# Patient Record
Sex: FEMALE | Race: WHITE | HISPANIC OR LATINO | Employment: FULL TIME | URBAN - METROPOLITAN AREA
[De-identification: names, ages, dates, MRNs, and addresses within clinical notes are randomized per-mention and may not be internally consistent; named-entity substitution may affect disease eponyms.]

---

## 2018-05-30 ENCOUNTER — HOSPITAL ENCOUNTER (EMERGENCY)
Facility: HOSPITAL | Age: 43
Discharge: HOME | End: 2018-05-30
Attending: EMERGENCY MEDICINE
Payer: COMMERCIAL

## 2018-05-30 VITALS
BODY MASS INDEX: 32.81 KG/M2 | SYSTOLIC BLOOD PRESSURE: 117 MMHG | TEMPERATURE: 97.6 F | WEIGHT: 168 LBS | HEART RATE: 72 BPM | RESPIRATION RATE: 16 BRPM | OXYGEN SATURATION: 100 % | DIASTOLIC BLOOD PRESSURE: 78 MMHG

## 2018-05-30 DIAGNOSIS — H66.91 RIGHT OTITIS MEDIA: Primary | ICD-10-CM

## 2018-05-30 PROCEDURE — 99283 EMERGENCY DEPT VISIT LOW MDM: CPT

## 2018-05-30 RX ORDER — NEOMYCIN SULFATE, POLYMYXIN B SULFATE AND HYDROCORTISONE 10; 3.5; 1 MG/ML; MG/ML; [USP'U]/ML
4 SUSPENSION/ DROPS AURICULAR (OTIC) 3 TIMES DAILY
Qty: 10 ML | Refills: 0 | Status: SHIPPED | OUTPATIENT
Start: 2018-05-30 | End: 2018-08-11 | Stop reason: ALTCHOICE

## 2018-05-30 RX ORDER — NAPROXEN 500 MG/1
500 TABLET ORAL 2 TIMES DAILY WITH MEALS
Qty: 20 TABLET | Refills: 0 | Status: SHIPPED | OUTPATIENT
Start: 2018-05-30 | End: 2018-08-11 | Stop reason: ALTCHOICE

## 2018-05-30 NOTE — ED PROVIDER NOTES
History  Chief Complaint   Patient presents with    Headache     Patient reports today she started experiencing a HA and dizziness  Patient reporting she recently has also been having R ear drainage and pain  c/o R sided ear pain and ear drainage since yest   She feels like this is causing her to have a R sided headaches  No fevers, no n/v, no congestion  Pt  Hasn't been swimming lately  She was trying to clear the drainage/wax out with a q-tip yest             None       Past Medical History:   Diagnosis Date    Vertigo        Past Surgical History:   Procedure Laterality Date     SECTION         History reviewed  No pertinent family history  I have reviewed and agree with the history as documented  Social History   Substance Use Topics    Smoking status: Current Every Day Smoker     Packs/day: 0 50     Types: Cigarettes    Smokeless tobacco: Never Used    Alcohol use No        Review of Systems   Constitutional: Negative for appetite change, fatigue and fever  HENT: Positive for ear discharge and ear pain  Negative for rhinorrhea and sore throat  Respiratory: Negative for cough, shortness of breath and wheezing  Cardiovascular: Negative for chest pain and leg swelling  Gastrointestinal: Negative for abdominal pain, diarrhea and vomiting  Genitourinary: Negative for dysuria and flank pain  Musculoskeletal: Negative for back pain and neck pain  Skin: Negative for rash  Neurological: Negative for syncope and headaches  Psychiatric/Behavioral:        Mood normal       Physical Exam  Physical Exam   Constitutional: She is oriented to person, place, and time  She appears well-developed and well-nourished  HENT:   Head: Normocephalic and atraumatic  Mouth/Throat: Oropharynx is clear and moist    b/l TM's clear, R ear canal is narrowed and tender, mild brownish discharge is present, L ear canal is normal   Neck: Normal range of motion  Neck supple     Cardiovascular: Normal rate and regular rhythm  Pulmonary/Chest: Effort normal and breath sounds normal    Abdominal: Soft  There is no tenderness  Musculoskeletal: Normal range of motion  Neurological: She is alert and oriented to person, place, and time  Skin: Skin is warm and dry  Nursing note and vitals reviewed  Vital Signs  ED Triage Vitals [05/30/18 1412]   Temperature Pulse Respirations Blood Pressure SpO2   97 6 °F (36 4 °C) 72 16 117/78 100 %      Temp Source Heart Rate Source Patient Position - Orthostatic VS BP Location FiO2 (%)   Temporal Monitor Sitting Right arm --      Pain Score       --           Vitals:    05/30/18 1412   BP: 117/78   Pulse: 72   Patient Position - Orthostatic VS: Sitting       Visual Acuity  Visual Acuity      Most Recent Value   L Pupil Size (mm)  3   R Pupil Size (mm)  3          ED Medications  Medications - No data to display    Diagnostic Studies  Results Reviewed     None                 No orders to display              Procedures  Procedures       Phone Contacts  ED Phone Contact    ED Course                               MDM  Number of Diagnoses or Management Options  Risk of Complications, Morbidity, and/or Mortality  Presenting problems: low      CritCare Time    Disposition  Final diagnoses:   Right otitis media     Time reflects when diagnosis was documented in both MDM as applicable and the Disposition within this note     Time User Action Codes Description Comment    5/30/2018  2:46 PM Pan Barragan Add [H66 91] Right otitis media       ED Disposition     ED Disposition Condition Comment    Discharge  Becki Gums discharge to home/self care      Condition at discharge: Stable        Follow-up Information     Follow up With Specialties Details Why Contact Info    Cleo Palm DO Family Medicine   [address] [place] [address]  [phone]            Discharge Medication List as of 5/30/2018  2:47 PM      START taking these medications Details   naproxen (NAPROSYN) 500 mg tablet Take 1 tablet (500 mg total) by mouth 2 (two) times a day with meals, Starting Wed 5/30/2018, Print      neomycin-polymyxin-hydrocortisone (CORTISPORIN) 0 35%-10,000 units/mL-1% otic suspension Administer 4 drops to the right ear 3 (three) times a day for 7 days, Starting Wed 5/30/2018, Until Wed 6/6/2018, Print           No discharge procedures on file      ED Provider  Electronically Signed by           Aman Powers MD  06/01/18 4479

## 2018-08-11 ENCOUNTER — APPOINTMENT (EMERGENCY)
Dept: RADIOLOGY | Facility: HOSPITAL | Age: 43
End: 2018-08-11
Payer: COMMERCIAL

## 2018-08-11 ENCOUNTER — HOSPITAL ENCOUNTER (EMERGENCY)
Facility: HOSPITAL | Age: 43
Discharge: HOME | End: 2018-08-11
Payer: COMMERCIAL

## 2018-08-11 VITALS
HEART RATE: 87 BPM | WEIGHT: 180 LBS | TEMPERATURE: 98.5 F | BODY MASS INDEX: 35.15 KG/M2 | DIASTOLIC BLOOD PRESSURE: 60 MMHG | OXYGEN SATURATION: 100 % | RESPIRATION RATE: 18 BRPM | SYSTOLIC BLOOD PRESSURE: 130 MMHG

## 2018-08-11 DIAGNOSIS — S63.602A LEFT THUMB SPRAIN: Primary | ICD-10-CM

## 2018-08-11 PROCEDURE — 73090 X-RAY EXAM OF FOREARM: CPT

## 2018-08-11 PROCEDURE — 99283 EMERGENCY DEPT VISIT LOW MDM: CPT

## 2018-08-11 PROCEDURE — 73130 X-RAY EXAM OF HAND: CPT

## 2018-08-11 RX ORDER — IBUPROFEN 400 MG/1
400 TABLET ORAL ONCE
Status: COMPLETED | OUTPATIENT
Start: 2018-08-11 | End: 2018-08-11

## 2018-08-11 RX ADMIN — IBUPROFEN 400 MG: 400 TABLET ORAL at 14:50

## 2018-08-11 NOTE — ED PROVIDER NOTES
History  Chief Complaint   Patient presents with    Hand Injury     Injured left hand while wrestling with friend today  States she heard a "crunch"  Patient reports horseplaying with her  just PTA  Notes  fell onto her left hand and bent her thumb backwards  Felt a cracking afterwards  Aroldo any other related injuries  Is concerned for fracture  History provided by:  Patient  Hand Injury   Associated symptoms: no fatigue        None       Past Medical History:   Diagnosis Date    Vertigo        Past Surgical History:   Procedure Laterality Date     SECTION         History reviewed  No pertinent family history  I have reviewed and agree with the history as documented  Social History   Substance Use Topics    Smoking status: Current Every Day Smoker     Packs/day: 0 50     Types: Cigarettes    Smokeless tobacco: Never Used    Alcohol use No        Review of Systems   Constitutional: Negative for activity change, appetite change and fatigue  HENT: Negative for nosebleeds, sneezing, sore throat, trouble swallowing and voice change  Eyes: Negative for photophobia, pain and visual disturbance  Respiratory: Negative for apnea, choking and stridor  Cardiovascular: Negative for palpitations and leg swelling  Gastrointestinal: Negative for anal bleeding and constipation  Endocrine: Negative for cold intolerance, heat intolerance, polydipsia and polyphagia  Genitourinary: Negative for decreased urine volume, enuresis, frequency, genital sores and urgency  Musculoskeletal: Negative for joint swelling and myalgias  Allergic/Immunologic: Negative for environmental allergies and food allergies  Neurological: Negative for tremors, seizures, speech difficulty and weakness  Hematological: Negative for adenopathy  Psychiatric/Behavioral: Negative for behavioral problems, decreased concentration, dysphoric mood and hallucinations         Physical Exam  Physical Exam   Constitutional: She is oriented to person, place, and time  She appears well-developed and well-nourished  No distress  HENT:   Head: Normocephalic and atraumatic  Right Ear: External ear normal    Left Ear: External ear normal    Nose: Nose normal    Mouth/Throat: Oropharynx is clear and moist    Eyes: Conjunctivae and EOM are normal  Pupils are equal, round, and reactive to light  Neck: Normal range of motion  Neck supple  Cardiovascular: Normal rate, regular rhythm and normal heart sounds  Exam reveals no gallop and no friction rub  No murmur heard  Pulmonary/Chest: Effort normal and breath sounds normal  No respiratory distress  She has no wheezes  Abdominal: Soft  Bowel sounds are normal    Musculoskeletal: She exhibits tenderness  She exhibits no deformity  Hands:  Neurological: She is alert and oriented to person, place, and time  Skin: Skin is warm and dry  She is not diaphoretic  Psychiatric: She has a normal mood and affect  Her behavior is normal    Vitals reviewed        Vital Signs  ED Triage Vitals   Temperature Pulse Respirations Blood Pressure SpO2   08/11/18 1436 08/11/18 1436 08/11/18 1436 08/11/18 1438 08/11/18 1436   98 5 °F (36 9 °C) 87 18 130/60 100 %      Temp Source Heart Rate Source Patient Position - Orthostatic VS BP Location FiO2 (%)   08/11/18 1436 -- -- -- --   Temporal          Pain Score       08/11/18 1436       Worst Possible Pain           Vitals:    08/11/18 1436 08/11/18 1438   BP:  130/60   Pulse: 87        Visual Acuity      ED Medications  Medications   ibuprofen (MOTRIN) tablet 400 mg (400 mg Oral Given 8/11/18 1450)       Diagnostic Studies  Results Reviewed     None                 XR forearm 2 views LEFT   ED Interpretation by Ernesto Brown PA-C (08/11 1531)   No acute abnormalities      XR hand 3+ views LEFT   ED Interpretation by Ernesto Brown PA-C (08/11 1530)   No acute abnormalities                 Procedures  Procedures       Phone Contacts  ED Phone Contact    ED Course                               MDM  CritCare Time    Disposition  Final diagnoses:   Left thumb sprain     Time reflects when diagnosis was documented in both MDM as applicable and the Disposition within this note     Time User Action Codes Description Comment    8/11/2018  3:32 PM Erich Castro 46 Left thumb sprain       ED Disposition     ED Disposition Condition Comment    Discharge  Marcelina Bushy discharge to home/self care  Condition at discharge: Stable        Follow-up Information     Follow up With Specialties Details Why Contact Info Additional Information    [facility]  Schedule an appointment as soon as possible for a visit  [address] [place] [address]  [phone] [facility], [address] [facility] [address] , [place], [place], [address]          Patient's Medications   Discharge Prescriptions    No medications on file     No discharge procedures on file      ED Provider  Electronically Signed by           Caro Smith PA-C  08/11/18 0612

## 2018-08-11 NOTE — DISCHARGE INSTRUCTIONS
Finger Sprain   AMBULATORY CARE:   A finger sprain  happens when ligaments in your finger or thumb are stretched or torn  Ligaments are the tough tissues that connect bones  Ligaments allow your hands to grasp and pinch  Common symptoms include the following:   · Bruising or changes in skin color    · Pain and stiffness     · Swelling and tenderness  Seek immediate care for the following symptoms:   · The skin on your injured finger looks bluish or pale (less color than normal)  · You have new weakness or numbness in your finger or thumb  · You have a splint that you cannot adjust and it feels too tight  Contact your healthcare provider if:   · You have new or increased swelling or pain in your finger  · You have new or increased stiffness when you move your injured finger  · You have questions or concerns about your injury or treatment  Treatment for a finger sprain  may include medicine to decrease pain  Do not wait until the pain is severe before taking your medicine  Care for a finger sprain:   · Rest  your finger for at least 48 hours  Do not do activities that cause pain  Return to normal activities as directed  · Apply ice  on your finger to help decrease pain and swelling  Put crushed ice in a plastic bag and cover it with a towel  Put the ice on your injured finger or thumb every hour for 15 to 20 minutes at a time  You may need to ice the area at least 4 to 8 times each day  Ice your finger for as many days as directed  · Elevate your finger  above the level of your heart as often as you can  This will help decrease swelling and pain  You can elevate your hand by resting it on a pillow  · Use a splint or compression as directed  Compression (tight hold) helps support your finger or thumb as it heals  Tape your injured finger to the finger beside it  Severe sprains may be treated with a splint  A splint prevents your finger from moving while it heals   Ask how long you must wear the splint or tape, and how to apply them  · Do exercises as directed  You may be given gentle exercises to begin in a few days  Exercises can help decrease stiffness in your finger or thumb  Exercises also help decrease pain and swelling and improve the movement of your finger or thumb  Check with your healthcare provider before you return to your normal activities or sports  Follow up with your healthcare provider as directed:  Write down your questions so you remember to ask them during your visits  © 2017 [facility] Information is for End User's use only and may not be sold, redistributed or otherwise used for commercial purposes  All illustrations and images included in CareNotes® are the copyrighted property of A D A M [employer] Inc  or Yosvany Denis  The above information is an  only  It is not intended as medical advice for individual conditions or treatments  Talk to your doctor, nurse or pharmacist before following any medical regimen to see if it is safe and effective for you

## 2018-08-11 NOTE — ED NOTES
Pt reports wrestling with a friend just PTA, and injuring left hand  Pt reports painful movement of hand        Fransisco Centeno RN  08/11/18 3294

## 2020-03-31 ENCOUNTER — TELEPHONE (OUTPATIENT)
Dept: FAMILY MEDICINE CLINIC | Facility: CLINIC | Age: 45
End: 2020-03-31

## 2020-09-15 ENCOUNTER — APPOINTMENT (OUTPATIENT)
Dept: RADIOLOGY | Age: 45
End: 2020-09-15
Attending: FAMILY MEDICINE
Payer: OTHER MISCELLANEOUS

## 2020-09-15 ENCOUNTER — OCCMED (OUTPATIENT)
Dept: URGENT CARE | Age: 45
End: 2020-09-15
Payer: OTHER MISCELLANEOUS

## 2020-09-15 DIAGNOSIS — S69.91XA FINGER INJURY, RIGHT, INITIAL ENCOUNTER: ICD-10-CM

## 2020-09-15 DIAGNOSIS — S69.91XA FINGER INJURY, RIGHT, INITIAL ENCOUNTER: Primary | ICD-10-CM

## 2020-09-15 PROCEDURE — 73140 X-RAY EXAM OF FINGER(S): CPT

## 2020-09-15 PROCEDURE — 99283 EMERGENCY DEPT VISIT LOW MDM: CPT | Performed by: FAMILY MEDICINE

## 2020-09-15 PROCEDURE — G0382 LEV 3 HOSP TYPE B ED VISIT: HCPCS | Performed by: FAMILY MEDICINE

## 2020-10-05 ENCOUNTER — OFFICE VISIT (OUTPATIENT)
Dept: OBGYN CLINIC | Facility: CLINIC | Age: 45
End: 2020-10-05
Payer: OTHER MISCELLANEOUS

## 2020-10-05 VITALS
SYSTOLIC BLOOD PRESSURE: 117 MMHG | WEIGHT: 187 LBS | DIASTOLIC BLOOD PRESSURE: 74 MMHG | HEART RATE: 68 BPM | BODY MASS INDEX: 35.3 KG/M2 | HEIGHT: 61 IN | TEMPERATURE: 98.9 F

## 2020-10-05 DIAGNOSIS — S60.031A CONTUSION OF RIGHT MIDDLE FINGER WITHOUT DAMAGE TO NAIL, INITIAL ENCOUNTER: Primary | ICD-10-CM

## 2020-10-05 PROCEDURE — 99204 OFFICE O/P NEW MOD 45 MIN: CPT | Performed by: SURGERY

## 2020-10-05 RX ORDER — METHYLPREDNISOLONE 4 MG/1
TABLET ORAL
Qty: 21 TABLET | Refills: 0 | Status: SHIPPED | OUTPATIENT
Start: 2020-10-05

## 2020-10-05 RX ORDER — MELOXICAM 15 MG/1
15 TABLET ORAL DAILY
Qty: 30 TABLET | Refills: 1 | Status: SHIPPED | OUTPATIENT
Start: 2020-10-05

## 2020-10-06 ENCOUNTER — TELEPHONE (OUTPATIENT)
Dept: OBGYN CLINIC | Facility: HOSPITAL | Age: 45
End: 2020-10-06

## 2020-10-08 DIAGNOSIS — S60.031A CONTUSION OF RIGHT MIDDLE FINGER WITHOUT DAMAGE TO NAIL, INITIAL ENCOUNTER: Primary | ICD-10-CM

## 2020-10-26 ENCOUNTER — OFFICE VISIT (OUTPATIENT)
Dept: OBGYN CLINIC | Facility: CLINIC | Age: 45
End: 2020-10-26
Payer: OTHER MISCELLANEOUS

## 2020-10-26 ENCOUNTER — APPOINTMENT (OUTPATIENT)
Dept: RADIOLOGY | Facility: AMBULARY SURGERY CENTER | Age: 45
End: 2020-10-26
Attending: SURGERY
Payer: COMMERCIAL

## 2020-10-26 VITALS
WEIGHT: 187 LBS | BODY MASS INDEX: 35.3 KG/M2 | SYSTOLIC BLOOD PRESSURE: 117 MMHG | HEART RATE: 66 BPM | TEMPERATURE: 98 F | DIASTOLIC BLOOD PRESSURE: 80 MMHG | HEIGHT: 61 IN

## 2020-10-26 DIAGNOSIS — M79.644 FINGER PAIN, RIGHT: Primary | ICD-10-CM

## 2020-10-26 DIAGNOSIS — S62.639A CLOSED FRACTURE OF TUFT OF DISTAL PHALANX OF FINGER: ICD-10-CM

## 2020-10-26 DIAGNOSIS — M79.644 FINGER PAIN, RIGHT: ICD-10-CM

## 2020-10-26 PROCEDURE — 99213 OFFICE O/P EST LOW 20 MIN: CPT | Performed by: SURGERY

## 2020-10-26 PROCEDURE — 73140 X-RAY EXAM OF FINGER(S): CPT

## 2020-10-27 ENCOUNTER — TELEPHONE (OUTPATIENT)
Dept: OBGYN CLINIC | Facility: HOSPITAL | Age: 45
End: 2020-10-27

## 2020-11-17 ENCOUNTER — TELEPHONE (OUTPATIENT)
Dept: OBGYN CLINIC | Facility: HOSPITAL | Age: 45
End: 2020-11-17

## 2020-11-19 ENCOUNTER — TELEPHONE (OUTPATIENT)
Dept: OBGYN CLINIC | Facility: CLINIC | Age: 45
End: 2020-11-19

## 2020-12-29 ENCOUNTER — TELEPHONE (OUTPATIENT)
Dept: OBGYN CLINIC | Facility: HOSPITAL | Age: 45
End: 2020-12-29

## 2020-12-30 ENCOUNTER — OFFICE VISIT (OUTPATIENT)
Dept: OBGYN CLINIC | Facility: CLINIC | Age: 45
End: 2020-12-30
Payer: OTHER MISCELLANEOUS

## 2020-12-30 VITALS
HEIGHT: 60 IN | DIASTOLIC BLOOD PRESSURE: 73 MMHG | BODY MASS INDEX: 37.69 KG/M2 | WEIGHT: 192 LBS | HEART RATE: 66 BPM | SYSTOLIC BLOOD PRESSURE: 107 MMHG

## 2020-12-30 DIAGNOSIS — S62.639A CLOSED FRACTURE OF TUFT OF DISTAL PHALANX OF FINGER: Primary | ICD-10-CM

## 2020-12-30 DIAGNOSIS — S60.031D CONTUSION OF RIGHT MIDDLE FINGER WITHOUT DAMAGE TO NAIL, SUBSEQUENT ENCOUNTER: ICD-10-CM

## 2020-12-30 PROCEDURE — 99213 OFFICE O/P EST LOW 20 MIN: CPT | Performed by: SURGERY

## 2021-01-05 ENCOUNTER — HOSPITAL ENCOUNTER (EMERGENCY)
Facility: HOSPITAL | Age: 46
Discharge: HOME | End: 2021-01-05
Attending: EMERGENCY MEDICINE
Payer: COMMERCIAL

## 2021-01-05 VITALS
TEMPERATURE: 97.5 F | DIASTOLIC BLOOD PRESSURE: 81 MMHG | WEIGHT: 191.58 LBS | OXYGEN SATURATION: 99 % | RESPIRATION RATE: 18 BRPM | BODY MASS INDEX: 37.42 KG/M2 | HEART RATE: 76 BPM | SYSTOLIC BLOOD PRESSURE: 135 MMHG

## 2021-01-05 DIAGNOSIS — R52 GENERALIZED BODY ACHES: Primary | ICD-10-CM

## 2021-01-05 DIAGNOSIS — R53.83 FATIGUE: ICD-10-CM

## 2021-01-05 DIAGNOSIS — B34.9 VIRAL SYNDROME: ICD-10-CM

## 2021-01-05 DIAGNOSIS — R05.9 COUGH: ICD-10-CM

## 2021-01-05 PROCEDURE — 99282 EMERGENCY DEPT VISIT SF MDM: CPT | Performed by: EMERGENCY MEDICINE

## 2021-01-05 PROCEDURE — U0003 INFECTIOUS AGENT DETECTION BY NUCLEIC ACID (DNA OR RNA); SEVERE ACUTE RESPIRATORY SYNDROME CORONAVIRUS 2 (SARS-COV-2) (CORONAVIRUS DISEASE [COVID-19]), AMPLIFIED PROBE TECHNIQUE, MAKING USE OF HIGH THROUGHPUT TECHNOLOGIES AS DESCRIBED BY CMS-2020-01-R: HCPCS | Performed by: EMERGENCY MEDICINE

## 2021-01-05 PROCEDURE — 99283 EMERGENCY DEPT VISIT LOW MDM: CPT

## 2021-01-05 NOTE — Clinical Note
Janet Cardona was seen and treated in our emergency department on 1/5/2021  Other - See Comments        Diagnosis: Viral syndrome    Stacie    She may return on this date:     PENDING COVID19 TEST RESULT  PATIENT MUST SELF-QUARANTINE AT HOME UNTIL NEGATIVE RESULT IS BACK  PATIENT MAY NOT RETURN TO WORK UNTIL A NEGATIVE RESULT IS BACK  If you have any questions or concerns, please don't hesitate to call        Johanna Munguia,     ______________________________           _______________          _______________  Hospital Representative                              Date                                Time

## 2021-01-06 NOTE — ED PROVIDER NOTES
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 and hand hygiene and getting tested twice weekly and negative  Even had rapid swab for COVID yesterday which was neg  History provided by:  Patient   used: No    Generalized Body Aches  Severity:  Mild  Onset quality:  Gradual  Duration:  4 days  Timing:  Constant  Progression:  Unchanged  Chronicity:  New  Associated symptoms: chest pain (tightness), cough (mild dry), fatigue (extreme), fever (subjective with chills), headaches and myalgias    Associated symptoms: no abdominal pain, no congestion, no diarrhea, no nausea, no rash, no shortness of breath, no sore throat, no vomiting and no wheezing    Chest pain:     Quality: tightness      Severity:  Mild    Duration:  4 days    Timing:  Intermittent  Cough:     Cough characteristics:  Dry    Severity:  Mild    Onset quality:  Gradual    Duration:  4 days  Fatigue:     Severity:  Severe    Duration:  4 days    Timing:  Constant    Progression:  Unchanged  Fever:     Temp source:  Subjective  Headaches:     Severity:  Mild    Onset quality:  Gradual    Duration:  4 days    Timing:  Intermittent  Myalgias:     Location:  Generalized    Quality:  Aching    Severity:  Mild    Onset quality:  Gradual    Duration:  4 days    Progression:  Unchanged      Prior to Admission Medications   Prescriptions Last Dose Informant Patient Reported?  Taking?   meloxicam (MOBIC) 15 mg tablet   No No   Sig: Take 1 tablet (15 mg total) by mouth daily   Patient not taking: Reported on 2020   methylPREDNISolone 4 MG tablet therapy pack   No No   Sig: Use as directed on package   Patient not taking: Reported on 10/26/2020      Facility-Administered Medications: None       Past Medical History:   Diagnosis Date    Vertigo        Past Surgical History:   Procedure Laterality Date     SECTION         History reviewed  No pertinent family history  I have reviewed and agree with the history as documented  E-Cigarette/Vaping     E-Cigarette/Vaping Substances     Social History     Tobacco Use    Smoking status: Current Every Day Smoker     Packs/day: 0 25     Types: Cigarettes    Smokeless tobacco: Never Used   Substance Use Topics    Alcohol use: No    Drug use: No       Review of Systems   Constitutional: Positive for chills, fatigue (extreme) and fever (subjective with chills)  Negative for appetite change  HENT: Negative for congestion and sore throat  Eyes: Negative for visual disturbance  Respiratory: Positive for cough (mild dry) and chest tightness  Negative for shortness of breath and wheezing  Cardiovascular: Positive for chest pain (tightness)  Negative for palpitations and leg swelling  Gastrointestinal: Negative for abdominal pain, diarrhea, nausea and vomiting  Genitourinary: Negative for dysuria, frequency, vaginal bleeding and vaginal discharge  Musculoskeletal: Positive for myalgias  Negative for back pain, neck pain and neck stiffness  Skin: Negative for pallor and rash  Allergic/Immunologic: Negative for immunocompromised state  Neurological: Positive for headaches  Negative for light-headedness  Psychiatric/Behavioral: Negative for confusion  All other systems reviewed and are negative  Physical Exam  Physical Exam  Vitals signs and nursing note reviewed  Constitutional:       General: She is not in acute distress  Appearance: She is well-developed  HENT:      Head: Normocephalic and atraumatic        Right Ear: External ear normal       Left Ear: External ear normal       Mouth/Throat:      Mouth: Mucous membranes are moist    Eyes:      Extraocular Movements: Extraocular movements intact  Neck:      Musculoskeletal: Neck supple  Cardiovascular:      Rate and Rhythm: Normal rate and regular rhythm  Heart sounds: No murmur  Pulmonary:      Effort: Pulmonary effort is normal  No respiratory distress  Breath sounds: Normal breath sounds  Chest:      Chest wall: No tenderness  Abdominal:      General: Bowel sounds are normal       Palpations: Abdomen is soft  Tenderness: There is no abdominal tenderness  Musculoskeletal: Normal range of motion  Skin:     General: Skin is warm  Coloration: Skin is not pale  Findings: No rash  Neurological:      General: No focal deficit present  Mental Status: She is alert and oriented to person, place, and time  Mental status is at baseline  Psychiatric:         Behavior: Behavior normal          Vital Signs  ED Triage Vitals [01/05/21 2232]   Temperature Pulse Respirations Blood Pressure SpO2   97 5 °F (36 4 °C) 76 18 135/81 99 %      Temp Source Heart Rate Source Patient Position - Orthostatic VS BP Location FiO2 (%)   Temporal Monitor Sitting Right arm --      Pain Score       9           Vitals:    01/05/21 2232   BP: 135/81   Pulse: 76   Patient Position - Orthostatic VS: Sitting         Visual Acuity      ED Medications  Medications - No data to display    Diagnostic Studies  Results Reviewed     None                 No orders to display              Procedures  Procedures         ED Course  ED Course as of Jan 05 2253   Tue Jan 05, 2021 2235 Pt seen and examined  55 yo female with 4 days of worsening fatigue, body aches, mild dry cough, chest tightness, subjective fever/chills and headache  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 and hand hygiene and getting tested twice weekly and negative   Even had rapid swab for COVID yesterday which was neg  Discussed that this may or may not be COVID but is a viral illness regardless and she will need to quarantine until COVID test returns and continue to treat self with supportive care  MDM    Disposition  Final diagnoses:   Generalized body aches   Cough   Viral syndrome   Fatigue     Time reflects when diagnosis was documented in both MDM as applicable and the Disposition within this note     Time User Action Codes Description Comment    1/5/2021 10:49 PM Tony BORREGO Add [R52] Generalized body aches     1/5/2021 10:49 PM Tony BORREGO Add [R05] Cough     1/5/2021 10:49 PM Tony BORREGO Add [B34 9] Viral syndrome     1/5/2021 10:50 PM Elvis Cline Add [R53 83] Fatigue       ED Disposition     ED Disposition Condition Date/Time Comment    Discharge Stable Tue Jan 5, 2021 10:49 PM Liliane Horner discharge to home/self care  Follow-up Information    None         Patient's Medications   Discharge Prescriptions    No medications on file     No discharge procedures on file      PDMP Review     None          ED Provider  Electronically Signed by           Mason Tabares DO  01/05/21 4945

## 2021-01-07 LAB — SARS-COV-2 RNA SPEC QL NAA+PROBE: NOT DETECTED

## 2021-01-07 NOTE — RESULT ENCOUNTER NOTE
I called Audelia Donohue and let her know that her COVID-19 swab was negative  She continues to experience body aches and fatigue  Recommended if symptoms don't improve to follow up with doctor  I advised her to continue social distancing procedures

## 2021-01-19 ENCOUNTER — TELEPHONE (OUTPATIENT)
Dept: OBGYN CLINIC | Facility: HOSPITAL | Age: 46
End: 2021-01-19

## 2024-08-14 ENCOUNTER — HOSPITAL ENCOUNTER (EMERGENCY)
Facility: HOSPITAL | Age: 49
Discharge: HOME | End: 2024-08-14
Attending: INTERNAL MEDICINE
Payer: COMMERCIAL

## 2024-08-14 ENCOUNTER — APPOINTMENT (EMERGENCY)
Dept: CT IMAGING | Facility: HOSPITAL | Age: 49
End: 2024-08-14
Payer: COMMERCIAL

## 2024-08-14 VITALS
OXYGEN SATURATION: 99 % | SYSTOLIC BLOOD PRESSURE: 118 MMHG | TEMPERATURE: 98.3 F | DIASTOLIC BLOOD PRESSURE: 59 MMHG | WEIGHT: 184.08 LBS | HEART RATE: 65 BPM | BODY MASS INDEX: 35.95 KG/M2 | RESPIRATION RATE: 18 BRPM

## 2024-08-14 DIAGNOSIS — R42 LIGHTHEADEDNESS: ICD-10-CM

## 2024-08-14 DIAGNOSIS — J98.11 ATELECTASIS: Primary | ICD-10-CM

## 2024-08-14 LAB
ALBUMIN SERPL BCG-MCNC: 3.9 G/DL (ref 3.5–5)
ALP SERPL-CCNC: 76 U/L (ref 34–104)
ALT SERPL W P-5'-P-CCNC: 22 U/L (ref 7–52)
ANION GAP SERPL CALCULATED.3IONS-SCNC: 8 MMOL/L (ref 4–13)
AST SERPL W P-5'-P-CCNC: 18 U/L (ref 13–39)
ATRIAL RATE: 70 BPM
ATRIAL RATE: 72 BPM
BASOPHILS # BLD AUTO: 0.06 THOUSANDS/ÂΜL (ref 0–0.1)
BASOPHILS NFR BLD AUTO: 1 % (ref 0–1)
BILIRUB SERPL-MCNC: 0.33 MG/DL (ref 0.2–1)
BNP SERPL-MCNC: 19 PG/ML (ref 0–100)
BUN SERPL-MCNC: 13 MG/DL (ref 5–25)
CALCIUM SERPL-MCNC: 8.9 MG/DL (ref 8.4–10.2)
CARDIAC TROPONIN I PNL SERPL HS: <2 NG/L
CARDIAC TROPONIN I PNL SERPL HS: <2 NG/L
CHLORIDE SERPL-SCNC: 104 MMOL/L (ref 96–108)
CO2 SERPL-SCNC: 23 MMOL/L (ref 21–32)
CREAT SERPL-MCNC: 0.69 MG/DL (ref 0.6–1.3)
D DIMER PPP FEU-MCNC: 0.38 UG/ML FEU
EOSINOPHIL # BLD AUTO: 0.13 THOUSAND/ÂΜL (ref 0–0.61)
EOSINOPHIL NFR BLD AUTO: 2 % (ref 0–6)
ERYTHROCYTE [DISTWIDTH] IN BLOOD BY AUTOMATED COUNT: 13.2 % (ref 11.6–15.1)
GFR SERPL CREATININE-BSD FRML MDRD: 101 ML/MIN/1.73SQ M
GLUCOSE SERPL-MCNC: 112 MG/DL (ref 65–140)
HCT VFR BLD AUTO: 39.7 % (ref 34.8–46.1)
HGB BLD-MCNC: 13.5 G/DL (ref 11.5–15.4)
IMM GRANULOCYTES # BLD AUTO: 0.03 THOUSAND/UL (ref 0–0.2)
IMM GRANULOCYTES NFR BLD AUTO: 0 % (ref 0–2)
LYMPHOCYTES # BLD AUTO: 2.8 THOUSANDS/ÂΜL (ref 0.6–4.47)
LYMPHOCYTES NFR BLD AUTO: 37 % (ref 14–44)
MAGNESIUM SERPL-MCNC: 2 MG/DL (ref 1.9–2.7)
MCH RBC QN AUTO: 30.4 PG (ref 26.8–34.3)
MCHC RBC AUTO-ENTMCNC: 34 G/DL (ref 31.4–37.4)
MCV RBC AUTO: 89 FL (ref 82–98)
MONOCYTES # BLD AUTO: 0.58 THOUSAND/ÂΜL (ref 0.17–1.22)
MONOCYTES NFR BLD AUTO: 8 % (ref 4–12)
NEUTROPHILS # BLD AUTO: 4.05 THOUSANDS/ÂΜL (ref 1.85–7.62)
NEUTS SEG NFR BLD AUTO: 52 % (ref 43–75)
NRBC BLD AUTO-RTO: 0 /100 WBCS
P AXIS: 59 DEGREES
P AXIS: 66 DEGREES
PLATELET # BLD AUTO: 207 THOUSANDS/UL (ref 149–390)
PMV BLD AUTO: 11.3 FL (ref 8.9–12.7)
POTASSIUM SERPL-SCNC: 3.8 MMOL/L (ref 3.5–5.3)
PR INTERVAL: 154 MS
PR INTERVAL: 158 MS
PROT SERPL-MCNC: 6.8 G/DL (ref 6.4–8.4)
QRS AXIS: 63 DEGREES
QRS AXIS: 70 DEGREES
QRSD INTERVAL: 74 MS
QRSD INTERVAL: 76 MS
QT INTERVAL: 400 MS
QT INTERVAL: 410 MS
QTC INTERVAL: 438 MS
QTC INTERVAL: 442 MS
RBC # BLD AUTO: 4.44 MILLION/UL (ref 3.81–5.12)
SODIUM SERPL-SCNC: 135 MMOL/L (ref 135–147)
T WAVE AXIS: 56 DEGREES
T WAVE AXIS: 62 DEGREES
TSH SERPL DL<=0.05 MIU/L-ACNC: 0.86 UIU/ML (ref 0.45–4.5)
VENTRICULAR RATE: 70 BPM
VENTRICULAR RATE: 72 BPM
WBC # BLD AUTO: 7.65 THOUSAND/UL (ref 4.31–10.16)

## 2024-08-14 PROCEDURE — 96374 THER/PROPH/DIAG INJ IV PUSH: CPT

## 2024-08-14 PROCEDURE — 93005 ELECTROCARDIOGRAM TRACING: CPT

## 2024-08-14 PROCEDURE — 71250 CT THORAX DX C-: CPT

## 2024-08-14 PROCEDURE — 83880 ASSAY OF NATRIURETIC PEPTIDE: CPT

## 2024-08-14 PROCEDURE — 84443 ASSAY THYROID STIM HORMONE: CPT

## 2024-08-14 PROCEDURE — 36415 COLL VENOUS BLD VENIPUNCTURE: CPT

## 2024-08-14 PROCEDURE — 99285 EMERGENCY DEPT VISIT HI MDM: CPT

## 2024-08-14 PROCEDURE — 99284 EMERGENCY DEPT VISIT MOD MDM: CPT

## 2024-08-14 PROCEDURE — 85379 FIBRIN DEGRADATION QUANT: CPT

## 2024-08-14 PROCEDURE — 83735 ASSAY OF MAGNESIUM: CPT

## 2024-08-14 PROCEDURE — 84484 ASSAY OF TROPONIN QUANT: CPT

## 2024-08-14 PROCEDURE — 94640 AIRWAY INHALATION TREATMENT: CPT

## 2024-08-14 PROCEDURE — 80053 COMPREHEN METABOLIC PANEL: CPT

## 2024-08-14 PROCEDURE — 85025 COMPLETE CBC W/AUTO DIFF WBC: CPT

## 2024-08-14 PROCEDURE — 96361 HYDRATE IV INFUSION ADD-ON: CPT

## 2024-08-14 PROCEDURE — 70450 CT HEAD/BRAIN W/O DYE: CPT

## 2024-08-14 PROCEDURE — 93010 ELECTROCARDIOGRAM REPORT: CPT

## 2024-08-14 RX ORDER — IPRATROPIUM BROMIDE AND ALBUTEROL SULFATE 2.5; .5 MG/3ML; MG/3ML
3 SOLUTION RESPIRATORY (INHALATION) ONCE
Status: COMPLETED | OUTPATIENT
Start: 2024-08-14 | End: 2024-08-14

## 2024-08-14 RX ORDER — KETOROLAC TROMETHAMINE 30 MG/ML
15 INJECTION, SOLUTION INTRAMUSCULAR; INTRAVENOUS ONCE
Status: COMPLETED | OUTPATIENT
Start: 2024-08-14 | End: 2024-08-14

## 2024-08-14 RX ADMIN — IPRATROPIUM BROMIDE AND ALBUTEROL SULFATE 3 ML: .5; 3 SOLUTION RESPIRATORY (INHALATION) at 16:31

## 2024-08-14 RX ADMIN — KETOROLAC TROMETHAMINE 15 MG: 30 INJECTION, SOLUTION INTRAMUSCULAR; INTRAVENOUS at 17:32

## 2024-08-14 RX ADMIN — SODIUM CHLORIDE 1000 ML: 0.9 INJECTION, SOLUTION INTRAVENOUS at 16:27

## 2024-08-14 NOTE — ED PROVIDER NOTES
History  Chief Complaint   Patient presents with    Dizziness     The pt states that she began experiencing blurry vision, fatigue, BP in the 90's at home, and dizziness this afternoon at work. Pt denies fever/chills.      Stacie is a 49-year-old distant history of vertigo presenting to the emergency department with lightheadedness and fatigue times a few days.  She reports that she checked her blood pressure at home and her systolic was in the 90s.  She states that her systolic is generally in the 120s.  Denies hypertension medications.  Reports that she has occasional moments of bilateral blurriness of her vision that spontaneously resolves.  She denies headaches, head trauma, loss of consciousness, loss of vision.  States that her glasses prescription is out of date.  States that since last night she has been having intermittent chest pain and shortness of breath.  Unilateral leg swelling mobilization, DVT/PE.  No known history of diabetes.  Reports that she does not seek medical care frequently and has not been seen by a provider in years.  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  States that she has had significant stressors in her life recently.       Dizziness  Quality:  Lightheadedness  Severity:  Moderate  Onset quality:  Gradual  Duration:  1 week  Timing:  Intermittent  Progression:  Unchanged  Chronicity:  New  Context: standing up    Ineffective treatments:  Lying down, being still and closing eyes  Associated symptoms: chest pain and shortness of breath    Associated symptoms: no blood in stool, no diarrhea, no headaches, no hearing loss, no nausea, no palpitations, no syncope, no tinnitus, no vision changes, no vomiting and no weakness    Risk factors: no multiple medications and no new medications        Prior to Admission Medications   Prescriptions Last Dose Informant Patient Reported? Taking?   meloxicam (MOBIC) 15 mg tablet   No  No   Sig: Take 1 tablet (15 mg total) by mouth daily   Patient not taking: Reported on 2020   methylPREDNISolone 4 MG tablet therapy pack   No No   Sig: Use as directed on package   Patient not taking: Reported on 10/26/2020      Facility-Administered Medications: None       Past Medical History:   Diagnosis Date    Vertigo        Past Surgical History:   Procedure Laterality Date     SECTION         No family history on file.  I have reviewed and agree with the history as documented.    E-Cigarette/Vaping     E-Cigarette/Vaping Substances     Social History     Tobacco Use    Smoking status: Every Day     Current packs/day: 0.25     Types: Cigarettes    Smokeless tobacco: Never   Substance Use Topics    Alcohol use: No    Drug use: No       Review of Systems   Constitutional:  Negative for chills, fatigue and fever.   HENT:  Negative for hearing loss and tinnitus.    Eyes:  Negative for photophobia, pain, redness and visual disturbance.   Respiratory:  Positive for shortness of breath. Negative for cough, choking and chest tightness.    Cardiovascular:  Positive for chest pain. Negative for palpitations, leg swelling and syncope.   Gastrointestinal:  Negative for abdominal pain, blood in stool, diarrhea, nausea and vomiting.   Genitourinary:  Negative for dysuria, flank pain and frequency.   Musculoskeletal:  Negative for back pain.   Neurological:  Positive for dizziness. Negative for tremors, seizures, syncope, facial asymmetry, speech difficulty, weakness, light-headedness, numbness and headaches.       Physical Exam  Physical Exam  Vitals and nursing note reviewed.   Constitutional:       General: She is not in acute distress.     Appearance: She is well-developed.   HENT:      Head: Normocephalic and atraumatic.   Eyes:      Conjunctiva/sclera: Conjunctivae normal.   Cardiovascular:      Rate and Rhythm: Normal rate and regular rhythm.      Pulses: Normal pulses.      Heart sounds: Normal heart  sounds. No murmur heard.  Pulmonary:      Effort: Pulmonary effort is normal. No respiratory distress.      Breath sounds: Normal breath sounds. No wheezing, rhonchi or rales.   Abdominal:      Palpations: Abdomen is soft.      Tenderness: There is no abdominal tenderness.   Musculoskeletal:         General: No swelling.      Cervical back: Neck supple.   Skin:     General: Skin is warm and dry.      Capillary Refill: Capillary refill takes less than 2 seconds.   Neurological:      Mental Status: She is alert.   Psychiatric:         Mood and Affect: Mood normal.         Vital Signs  ED Triage Vitals   Temperature Pulse Respirations Blood Pressure SpO2   08/14/24 1526 08/14/24 1526 08/14/24 1526 08/14/24 1526 08/14/24 1526   98.3 °F (36.8 °C) 73 18 137/64 99 %      Temp Source Heart Rate Source Patient Position - Orthostatic VS BP Location FiO2 (%)   08/14/24 1526 08/14/24 1526 08/14/24 1526 08/14/24 1526 --   Oral Monitor Lying Right arm       Pain Score       08/14/24 1649       10 - Worst Possible Pain           Vitals:    08/14/24 1649 08/14/24 1745 08/14/24 1800 08/14/24 1830   BP: (S) 156/75 115/68 108/53 118/59   Pulse: 76 64 69 65   Patient Position - Orthostatic VS: Sitting Sitting           Visual Acuity      ED Medications  Medications   sodium chloride 0.9 % bolus 1,000 mL (0 mL Intravenous Stopped 8/14/24 1735)   ipratropium-albuterol (DUO-NEB) 0.5-2.5 mg/3 mL inhalation solution 3 mL (3 mL Nebulization Given 8/14/24 1631)   ketorolac (TORADOL) injection 15 mg (15 mg Intravenous Given 8/14/24 1732)       Diagnostic Studies  Results Reviewed       Procedure Component Value Units Date/Time    HS Troponin I 2hr [[id number]] Collected: 08/14/24 1853    Lab Status: Final result Specimen: Blood from Arm, Right Updated: 08/14/24 1921     hs TnI 2hr <2 ng/L      Delta 2hr hsTnI --    HS Troponin I 4hr [[id number]]     Lab Status: No result Specimen: Blood     TSH [[id number]]  (Normal) Collected: 08/14/24 1626     Lab Status: Final result Specimen: Blood from Arm, Right Updated: 08/14/24 1710     TSH 3RD GENERATON 0.858 uIU/mL     HS Troponin 0hr (reflex protocol) [[id number]]  (Normal) Collected: 08/14/24 1626    Lab Status: Final result Specimen: Blood from Arm, Right Updated: 08/14/24 1701     hs TnI 0hr <2 ng/L     B-Type Natriuretic Peptide(BNP) [[id number]]  (Normal) Collected: 08/14/24 1626    Lab Status: Final result Specimen: Blood from Arm, Right Updated: 08/14/24 1700     BNP 19 pg/mL     Comprehensive metabolic panel [[id number]] Collected: 08/14/24 1626    Lab Status: Final result Specimen: Blood from Arm, Right Updated: 08/14/24 1655     Sodium 135 mmol/L      Potassium 3.8 mmol/L      Chloride 104 mmol/L      CO2 23 mmol/L      ANION GAP 8 mmol/L      BUN 13 mg/dL      Creatinine 0.69 mg/dL      Glucose 112 mg/dL      Calcium 8.9 mg/dL      AST 18 U/L      ALT 22 U/L      Alkaline Phosphatase 76 U/L      Total Protein 6.8 g/dL      Albumin 3.9 g/dL      Total Bilirubin 0.33 mg/dL      eGFR 101 ml/min/1.73sq m     Narrative:      [employer] guidelines for Chronic Kidney Disease (CKD):     Stage 1 with normal or high GFR (GFR > 90 mL/min/1.73 square meters)    Stage 2 Mild CKD (GFR = 60-89 mL/min/1.73 square meters)    Stage 3A Moderate CKD (GFR = 45-59 mL/min/1.73 square meters)    Stage 3B Moderate CKD (GFR = 30-44 mL/min/1.73 square meters)    Stage 4 Severe CKD (GFR = 15-29 mL/min/1.73 square meters)    Stage 5 End Stage CKD (GFR <15 mL/min/1.73 square meters)  Note: GFR calculation is accurate only with a steady state creatinine    Magnesium [[id number]]  (Normal) Collected: 08/14/24 1626    Lab Status: Final result Specimen: Blood from Arm, Right Updated: 08/14/24 1655     Magnesium 2.0 mg/dL     D-Dimer [[id number]]  (Normal) Collected: 08/14/24 1626    Lab Status: Final result Specimen: Blood from Arm, Right Updated: 08/14/24 6196     D-Dimer, Quant 0.38 ug/ml FEU     Narrative:      In  the evaluation for possible pulmonary embolism, in the appropriate (Well's Score of 4 or less) patient, the age adjusted d-dimer cutoff for this patient can be calculated as:    Age x 0.01 (in ug/mL) for Age-adjusted D-dimer exclusion threshold for a patient over 50 years.    CBC and differential [[id number]] Collected: 08/14/24 1626    Lab Status: Final result Specimen: Blood from Arm, Right Updated: 08/14/24 1633     WBC 7.65 Thousand/uL      RBC 4.44 Million/uL      Hemoglobin 13.5 g/dL      Hematocrit 39.7 %      MCV 89 fL      MCH 30.4 pg      MCHC 34.0 g/dL      RDW 13.2 %      MPV 11.3 fL      Platelets 207 Thousands/uL      nRBC 0 /100 WBCs      Segmented % 52 %      Immature Grans % 0 %      Lymphocytes % 37 %      Monocytes % 8 %      Eosinophils Relative 2 %      Basophils Relative 1 %      Absolute Neutrophils 4.05 Thousands/µL      Absolute Immature Grans 0.03 Thousand/uL      Absolute Lymphocytes 2.80 Thousands/µL      Absolute Monocytes 0.58 Thousand/µL      Eosinophils Absolute 0.13 Thousand/µL      Basophils Absolute 0.06 Thousands/µL                    CT chest without contrast   Final Result by Hans Christina MD (08/14 1834)      Scattered areas of subsegmental atelectasis.               Workstation performed: [id number]         CT head without contrast   Final Result by Hans Christina MD (08/14 1828)      No acute intracranial abnormality.                  Workstation performed: [id number]                    Procedures  ECG 12 Lead Documentation Only    Date/Time: 8/14/2024 7:17 PM    Performed by: Lindsay Don PA-C  Authorized by: Lindsay Don PA-C    ECG reviewed by me, the ED Provider: yes    Patient location:  ED  Previous ECG:     Previous ECG:  Compared to current    Similarity:  No change  Interpretation:     Interpretation: normal    Rate:     ECG rate:  60    ECG rate assessment: normal    Ectopy:     Ectopy: none    QRS:     QRS axis:  Normal  Conduction:     Conduction:  normal    ST segments:     ST segments:  Normal  T waves:     T waves: normal             ED Course  ED Course as of 08/14/24 2107   Wed Aug 14, 2024   1843 hs TnI 0hr: <2  Pt had an episode of chest pain after collection. Will obtain 2 hr   1843 D-Dimer, Quant: 0.38   1843 BNP: 19   1843 WBC: 7.65               HEART Risk Score      Flowsheet Row Most Recent Value   Heart Score Risk Calculator    History 1 Filed at: 08/14/2024 1914   ECG 0 Filed at: 08/14/2024 1914   Age 1 Filed at: 08/14/2024 1914   Risk Factors 1 Filed at: 08/14/2024 1914   Troponin 0 Filed at: 08/14/2024 1914   HEART Score 3 Filed at: 08/14/2024 1914                          SBIRT 22yo+      Flowsheet Row Most Recent Value   Initial Alcohol Screen:  AUDIT-C     1. How often do you have a drink containing alcohol? 0 Filed at: 08/14/2024 1527   2. How many drinks containing alcohol do you have on a typical day you are drinking?  0 Filed at: 08/14/2024 1527   3b. FEMALE Any Age, or MALE 65+: How often do you have 4 or more drinks on one occassion? 0 Filed at: 08/14/2024 1527   Audit-C Score 0 Filed at: 08/14/2024 1527   DAREN: How many times in the past year have you...    Used an illegal drug or used a prescription medication for non-medical reasons? Never Filed at: 08/14/2024 1527                      Medical Decision Making  DDx includes ACS, arrhythmia, pneumonia, PE, CVA, TIA, cerebral mass, vertigo, dehydration, electrolyte abnormality  CBC ordered to rule out infection, anemia.  CMP ordered to rule out electrolyte abnormality, kidney injury, liver injury.  Lipase ordered to rule out pancreatitis.  D-Dimer negative. 0hr trop negative. Ecg NSR  Patient ambulates with stable gait. Had an episode of chest pain after collection of initial labs. Obtained 2hr delta.   Discussed ct findings. Reccommended establishing with PCP and follow-up.    Discussed findings from the visit with the patient.  We had a conversation regarding supportive care and  "indications for return.  Recommended appropriate follow-up.  Patient and/or family understand and agree with plan.    Portions of the record may have been created with voice recognition software. Occasional use of the incorrect word or \"sound a like\" substitutions may have occurred due to the inherent limitations of voice recognition software. Read the chart carefully and recognize, using context, where substitutions have occurred.       Amount and/or Complexity of Data Reviewed  Labs: ordered. Decision-making details documented in ED Course.  Radiology: ordered.    Risk  Prescription drug management.                 Disposition  Final diagnoses:   Atelectasis   Lightheadedness     Time reflects when diagnosis was documented in both MDM as applicable and the Disposition within this note       Time User Action Codes Description Comment    8/14/2024  6:49 PM Lindsay Don [J98.11] Atelectasis     8/14/2024  6:49 PM Lindsay Don [R42] Lightheadedness           ED Disposition       ED Disposition   Discharge    Condition   Stable    Date/Time   Wed Aug 14, 2024 1927    Comment   Stacie Hand discharge to home/self care.                   Follow-up Information    None         Discharge Medication List as of 8/14/2024  7:27 PM        CONTINUE these medications which have NOT CHANGED    Details   meloxicam (MOBIC) 15 mg tablet Take 1 tablet (15 mg total) by mouth daily, Starting Mon 10/5/2020, Normal      methylPREDNISolone 4 MG tablet therapy pack Use as directed on package, Normal                 PDMP Review       None            ED Provider  Electronically Signed by             Lindsay Don PA-C  08/14/24 2930    "

## 2024-08-14 NOTE — Clinical Note
Stacie Hand was seen and treated in our emergency department on 8/14/2024.                Diagnosis: Medical condition    Stacie  may return to work on return date.    She may return on this date: 08/16/2024         If you have any questions or concerns, please don't hesitate to call.      Linsday Don PA-C    ______________________________           _______________          _______________  Hospital Representative                              Date                                Time

## 2024-08-14 NOTE — Clinical Note
Stacie Hand was seen and treated in our emergency department on 8/14/2024.                Diagnosis: Medical condition    Stacie  may return to work on return date.    She may return on this date: 08/15/2024         If you have any questions or concerns, please don't hesitate to call.      Lindsay Don PA-C    ______________________________           _______________          _______________  Hospital Representative                              Date                                Time

## 2024-08-15 LAB
ATRIAL RATE: 60 BPM
P AXIS: 66 DEGREES
PR INTERVAL: 162 MS
QRS AXIS: 68 DEGREES
QRSD INTERVAL: 68 MS
QT INTERVAL: 430 MS
QTC INTERVAL: 430 MS
T WAVE AXIS: 57 DEGREES
VENTRICULAR RATE: 60 BPM

## 2024-08-15 PROCEDURE — 93010 ELECTROCARDIOGRAM REPORT: CPT | Performed by: STUDENT IN AN ORGANIZED HEALTH CARE EDUCATION/TRAINING PROGRAM

## 2024-08-16 ENCOUNTER — HOSPITAL ENCOUNTER (EMERGENCY)
Facility: HOSPITAL | Age: 49
Discharge: HOME | End: 2024-08-16
Attending: OBSTETRICS & GYNECOLOGY

## 2024-08-16 ENCOUNTER — APPOINTMENT (EMERGENCY)
Dept: CT IMAGING | Facility: HOSPITAL | Age: 49
End: 2024-08-16

## 2024-08-16 VITALS
DIASTOLIC BLOOD PRESSURE: 59 MMHG | RESPIRATION RATE: 16 BRPM | TEMPERATURE: 97.9 F | BODY MASS INDEX: 35.91 KG/M2 | SYSTOLIC BLOOD PRESSURE: 121 MMHG | HEART RATE: 65 BPM | OXYGEN SATURATION: 98 % | WEIGHT: 183.86 LBS

## 2024-08-16 DIAGNOSIS — K27.9 PUD (PEPTIC ULCER DISEASE): ICD-10-CM

## 2024-08-16 DIAGNOSIS — K59.00 CONSTIPATION, UNSPECIFIED CONSTIPATION TYPE: Primary | ICD-10-CM

## 2024-08-16 LAB
ALBUMIN SERPL BCG-MCNC: 4.1 G/DL (ref 3.5–5)
ALP SERPL-CCNC: 66 U/L (ref 34–104)
ALT SERPL W P-5'-P-CCNC: 23 U/L (ref 7–52)
ANION GAP SERPL CALCULATED.3IONS-SCNC: 7 MMOL/L (ref 4–13)
AST SERPL W P-5'-P-CCNC: 17 U/L (ref 13–39)
BACTERIA UR QL AUTO: ABNORMAL /HPF
BASOPHILS # BLD AUTO: 0.08 THOUSANDS/ÂΜL (ref 0–0.1)
BASOPHILS NFR BLD AUTO: 1 % (ref 0–1)
BILIRUB SERPL-MCNC: 0.41 MG/DL (ref 0.2–1)
BILIRUB UR QL STRIP: NEGATIVE
BUN SERPL-MCNC: 11 MG/DL (ref 5–25)
CALCIUM SERPL-MCNC: 8.8 MG/DL (ref 8.4–10.2)
CHLORIDE SERPL-SCNC: 106 MMOL/L (ref 96–108)
CLARITY UR: CLEAR
CO2 SERPL-SCNC: 22 MMOL/L (ref 21–32)
COLOR UR: COLORLESS
CREAT SERPL-MCNC: 0.64 MG/DL (ref 0.6–1.3)
EOSINOPHIL # BLD AUTO: 0.2 THOUSAND/ÂΜL (ref 0–0.61)
EOSINOPHIL NFR BLD AUTO: 2 % (ref 0–6)
ERYTHROCYTE [DISTWIDTH] IN BLOOD BY AUTOMATED COUNT: 13.4 % (ref 11.6–15.1)
EXT PREGNANCY TEST URINE: NEGATIVE
EXT. CONTROL: NORMAL
GFR SERPL CREATININE-BSD FRML MDRD: 104 ML/MIN/1.73SQ M
GLUCOSE SERPL-MCNC: 107 MG/DL (ref 65–140)
GLUCOSE UR STRIP-MCNC: NEGATIVE MG/DL
HCT VFR BLD AUTO: 40.8 % (ref 34.8–46.1)
HGB BLD-MCNC: 13.6 G/DL (ref 11.5–15.4)
HGB UR QL STRIP.AUTO: ABNORMAL
IMM GRANULOCYTES # BLD AUTO: 0.04 THOUSAND/UL (ref 0–0.2)
IMM GRANULOCYTES NFR BLD AUTO: 0 % (ref 0–2)
KETONES UR STRIP-MCNC: NEGATIVE MG/DL
LEUKOCYTE ESTERASE UR QL STRIP: NEGATIVE
LIPASE SERPL-CCNC: 19 U/L (ref 11–82)
LYMPHOCYTES # BLD AUTO: 2.94 THOUSANDS/ÂΜL (ref 0.6–4.47)
LYMPHOCYTES NFR BLD AUTO: 26 % (ref 14–44)
MCH RBC QN AUTO: 29.8 PG (ref 26.8–34.3)
MCHC RBC AUTO-ENTMCNC: 33.3 G/DL (ref 31.4–37.4)
MCV RBC AUTO: 90 FL (ref 82–98)
MONOCYTES # BLD AUTO: 0.93 THOUSAND/ÂΜL (ref 0.17–1.22)
MONOCYTES NFR BLD AUTO: 8 % (ref 4–12)
NEUTROPHILS # BLD AUTO: 7.15 THOUSANDS/ÂΜL (ref 1.85–7.62)
NEUTS SEG NFR BLD AUTO: 63 % (ref 43–75)
NITRITE UR QL STRIP: NEGATIVE
NON-SQ EPI CELLS URNS QL MICRO: ABNORMAL /HPF
NRBC BLD AUTO-RTO: 0 /100 WBCS
PH UR STRIP.AUTO: 6.5 [PH]
PLATELET # BLD AUTO: 213 THOUSANDS/UL (ref 149–390)
PMV BLD AUTO: 11.1 FL (ref 8.9–12.7)
POTASSIUM SERPL-SCNC: 4.1 MMOL/L (ref 3.5–5.3)
PROT SERPL-MCNC: 7.2 G/DL (ref 6.4–8.4)
PROT UR STRIP-MCNC: NEGATIVE MG/DL
RBC # BLD AUTO: 4.56 MILLION/UL (ref 3.81–5.12)
RBC #/AREA URNS AUTO: ABNORMAL /HPF
SODIUM SERPL-SCNC: 135 MMOL/L (ref 135–147)
SP GR UR STRIP.AUTO: 1 (ref 1–1.03)
UROBILINOGEN UR STRIP-ACNC: <2 MG/DL
WBC # BLD AUTO: 11.34 THOUSAND/UL (ref 4.31–10.16)
WBC #/AREA URNS AUTO: ABNORMAL /HPF

## 2024-08-16 PROCEDURE — 81001 URINALYSIS AUTO W/SCOPE: CPT

## 2024-08-16 PROCEDURE — 99285 EMERGENCY DEPT VISIT HI MDM: CPT

## 2024-08-16 PROCEDURE — 80053 COMPREHEN METABOLIC PANEL: CPT

## 2024-08-16 PROCEDURE — 96375 TX/PRO/DX INJ NEW DRUG ADDON: CPT

## 2024-08-16 PROCEDURE — 99283 EMERGENCY DEPT VISIT LOW MDM: CPT

## 2024-08-16 PROCEDURE — 83690 ASSAY OF LIPASE: CPT

## 2024-08-16 PROCEDURE — 81025 URINE PREGNANCY TEST: CPT

## 2024-08-16 PROCEDURE — 74177 CT ABD & PELVIS W/CONTRAST: CPT

## 2024-08-16 PROCEDURE — 36415 COLL VENOUS BLD VENIPUNCTURE: CPT

## 2024-08-16 PROCEDURE — 96374 THER/PROPH/DIAG INJ IV PUSH: CPT

## 2024-08-16 PROCEDURE — 96361 HYDRATE IV INFUSION ADD-ON: CPT

## 2024-08-16 PROCEDURE — 85025 COMPLETE CBC W/AUTO DIFF WBC: CPT

## 2024-08-16 RX ORDER — PANTOPRAZOLE SODIUM 40 MG/1
40 TABLET, DELAYED RELEASE ORAL DAILY
Qty: 20 TABLET | Refills: 0 | Status: SHIPPED | OUTPATIENT
Start: 2024-08-16 | End: 2024-08-16

## 2024-08-16 RX ORDER — PANTOPRAZOLE SODIUM 40 MG/1
40 TABLET, DELAYED RELEASE ORAL DAILY
Qty: 20 TABLET | Refills: 0 | Status: SHIPPED | OUTPATIENT
Start: 2024-08-16 | End: 2024-09-05

## 2024-08-16 RX ORDER — ONDANSETRON 4 MG/1
4 TABLET, FILM COATED ORAL EVERY 6 HOURS
Qty: 12 TABLET | Refills: 0 | Status: SHIPPED | OUTPATIENT
Start: 2024-08-16

## 2024-08-16 RX ORDER — ONDANSETRON 2 MG/ML
4 INJECTION INTRAMUSCULAR; INTRAVENOUS ONCE
Status: COMPLETED | OUTPATIENT
Start: 2024-08-16 | End: 2024-08-16

## 2024-08-16 RX ORDER — POLYETHYLENE GLYCOL 3350 17 G/17G
17 POWDER, FOR SOLUTION ORAL DAILY
Qty: 51 G | Refills: 0 | Status: SHIPPED | OUTPATIENT
Start: 2024-08-16

## 2024-08-16 RX ORDER — KETOROLAC TROMETHAMINE 30 MG/ML
15 INJECTION, SOLUTION INTRAMUSCULAR; INTRAVENOUS ONCE
Status: COMPLETED | OUTPATIENT
Start: 2024-08-16 | End: 2024-08-16

## 2024-08-16 RX ORDER — ALUMINUM HYDROXIDE, MAGNESIUM HYDROXIDE, SIMETHICONE 400; 400; 40 MG/10ML; MG/10ML; MG/10ML
30 SUSPENSION ORAL
Qty: 355 ML | Refills: 0 | Status: SHIPPED | OUTPATIENT
Start: 2024-08-16

## 2024-08-16 RX ORDER — MAGNESIUM HYDROXIDE/ALUMINUM HYDROXICE/SIMETHICONE 120; 1200; 1200 MG/30ML; MG/30ML; MG/30ML
30 SUSPENSION ORAL ONCE
Status: COMPLETED | OUTPATIENT
Start: 2024-08-16 | End: 2024-08-16

## 2024-08-16 RX ADMIN — ONDANSETRON 4 MG: 2 INJECTION INTRAMUSCULAR; INTRAVENOUS at 03:44

## 2024-08-16 RX ADMIN — IOHEXOL 100 ML: 350 INJECTION, SOLUTION INTRAVENOUS at 04:05

## 2024-08-16 RX ADMIN — KETOROLAC TROMETHAMINE 15 MG: 30 INJECTION, SOLUTION INTRAMUSCULAR; INTRAVENOUS at 03:42

## 2024-08-16 RX ADMIN — ALUMINUM HYDROXIDE, MAGNESIUM HYDROXIDE, DIMETHICONE 30 ML: 400; 400; 40 SUSPENSION ORAL at 06:38

## 2024-08-16 RX ADMIN — SODIUM CHLORIDE 1000 ML: 0.9 INJECTION, SOLUTION INTRAVENOUS at 03:33

## 2024-08-16 RX ADMIN — MORPHINE SULFATE 2 MG: 2 INJECTION, SOLUTION INTRAMUSCULAR; INTRAVENOUS at 05:43

## 2024-08-16 NOTE — ED NOTES
Patient requesting work note excuse for today and tomorrow. Provider aware.     Dinah Walters RN  08/16/24 4717

## 2024-08-16 NOTE — DISCHARGE INSTRUCTIONS
Take medication as prescribed  Follow-up with GI in outpatient setting  Return to emergency department develop fever, body aches, chills, increasing abdominal pain, increased nausea and vomiting controlled by current medications, rectal bleeding or bloody diarrhea.

## 2024-08-16 NOTE — Clinical Note
Stacie Hand was seen and treated in our emergency department on 8/16/2024.                Diagnosis: Peptic ulcer disease    Stacie  may return to work on return date.    She may return on this date: 08/19/2024         If you have any questions or concerns, please don't hesitate to call.      Ciaran Pandya PA-C    ______________________________           _______________          _______________  Hospital Representative                              Date                                Time

## 2024-08-17 NOTE — ED PROVIDER NOTES
History  Chief Complaint   Patient presents with    Constipation     Pt reports constipation for several days +n/v. Unknown last BM. Tried home remedies without result.      Patient is a 49-year-old female she is Emergency Department with constipation for several days.  Patient states last bowel movement was 3 days ago.  Patient states as of today she started with nausea and vomiting.  Patient states she tried home remedies such as increasing water, coffee, fiber, as well as a bowel cleanse with sediment in her with no relief.  Patient denies taking any medications for constipation symptoms.  Patient admits to right upper quadrant and epigastric tenderness that started today.  Patient describes the pain as sharp throbbing pain that radiates to the back where she feels like her right kidney would be.  Patient states that she only vomited once but is still extremely nauseous, patient describes the vomit is nonbilious. Patient denies any fever, body aches, chills, hematemesis, rectal bleeding, or diarrhea.        Prior to Admission Medications   Prescriptions Last Dose Informant Patient Reported? Taking?   meloxicam (MOBIC) 15 mg tablet   No No   Sig: Take 1 tablet (15 mg total) by mouth daily   Patient not taking: Reported on 2020   methylPREDNISolone 4 MG tablet therapy pack   No No   Sig: Use as directed on package   Patient not taking: Reported on 10/26/2020      Facility-Administered Medications: None       Past Medical History:   Diagnosis Date    Vertigo        Past Surgical History:   Procedure Laterality Date     SECTION         History reviewed. No pertinent family history.  I have reviewed and agree with the history as documented.    E-Cigarette/Vaping     E-Cigarette/Vaping Substances     Social History     Tobacco Use    Smoking status: Every Day     Current packs/day: 0.25     Types: Cigarettes    Smokeless tobacco: Never   Substance Use Topics    Alcohol use: No    Drug use: No       Review  of Systems   Constitutional:  Negative for chills, diaphoresis, fatigue and fever.   HENT:  Negative for congestion, ear pain, rhinorrhea, sinus pressure, sinus pain and sore throat.    Eyes:  Negative for pain, discharge, redness, itching and visual disturbance.   Respiratory:  Negative for cough, choking, chest tightness, shortness of breath, wheezing and stridor.    Cardiovascular:  Negative for chest pain, palpitations and leg swelling.   Gastrointestinal:  Positive for abdominal pain, constipation, nausea and vomiting. Negative for abdominal distention, anal bleeding, blood in stool, diarrhea and rectal pain.   Genitourinary:  Negative for decreased urine volume, dyspareunia, dysuria, enuresis, flank pain, frequency, genital sores, hematuria, menstrual problem, pelvic pain, urgency, vaginal bleeding, vaginal discharge and vaginal pain.   Musculoskeletal:  Negative for arthralgias and back pain.   Skin:  Negative for color change and rash.   Neurological:  Negative for dizziness, seizures, syncope, light-headedness and headaches.   All other systems reviewed and are negative.      Physical Exam  Physical Exam  Constitutional:       General: She is not in acute distress.     Appearance: Normal appearance. She is not ill-appearing, toxic-appearing or diaphoretic.   HENT:      Head: Normocephalic and atraumatic.      Right Ear: Tympanic membrane normal. There is no impacted cerumen.      Left Ear: Tympanic membrane normal. There is no impacted cerumen.      Nose: Nose normal. No congestion or rhinorrhea.      Mouth/Throat:      Mouth: Mucous membranes are moist.      Pharynx: Oropharynx is clear. No oropharyngeal exudate or posterior oropharyngeal erythema.   Eyes:      General:         Right eye: No discharge.         Left eye: No discharge.      Conjunctiva/sclera: Conjunctivae normal.      Pupils: Pupils are equal, round, and reactive to light.   Cardiovascular:      Rate and Rhythm: Normal rate and regular  rhythm.      Pulses: Normal pulses.      Heart sounds: Normal heart sounds. No murmur heard.     No friction rub. No gallop.   Pulmonary:      Effort: Pulmonary effort is normal. No respiratory distress.      Breath sounds: Normal breath sounds. No stridor. No wheezing, rhonchi or rales.   Chest:      Chest wall: No tenderness.   Abdominal:      General: Abdomen is flat. There is no distension or abdominal bruit. There are no signs of injury.      Palpations: Abdomen is soft. There is no shifting dullness, fluid wave, hepatomegaly, splenomegaly, mass or pulsatile mass.      Tenderness: There is abdominal tenderness in the right upper quadrant and epigastric area. There is right CVA tenderness. There is no left CVA tenderness, guarding or rebound. Positive signs include Conrad's sign. Negative signs include Rovsing's sign, McBurney's sign, psoas sign and obturator sign.      Hernia: No hernia is present. There is no hernia in the umbilical area, ventral area, left inguinal area, right femoral area, left femoral area or right inguinal area.   Musculoskeletal:      Cervical back: Normal range of motion. No rigidity or tenderness.   Lymphadenopathy:      Cervical: No cervical adenopathy.   Skin:     General: Skin is warm.      Capillary Refill: Capillary refill takes less than 2 seconds.      Coloration: Skin is not jaundiced or pale.      Findings: No bruising, erythema, lesion or rash.   Neurological:      Mental Status: She is alert.         Vital Signs  ED Triage Vitals [08/16/24 0314]   Temperature Pulse Respirations Blood Pressure SpO2   97.9 °F (36.6 °C) 64 18 141/84 99 %      Temp Source Heart Rate Source Patient Position - Orthostatic VS BP Location FiO2 (%)   Oral Monitor Sitting Right arm --      Pain Score       10 - Worst Possible Pain           Vitals:    08/16/24 0314 08/16/24 0548 08/16/24 0600   BP: 141/84 128/62 121/59   Pulse: 64 59 65   Patient Position - Orthostatic VS: Sitting           Visual  Acuity      ED Medications  Medications   sodium chloride 0.9 % bolus 1,000 mL (0 mL Intravenous Stopped 8/16/24 0449)   ondansetron (ZOFRAN) injection 4 mg (4 mg Intravenous Given 8/16/24 0344)   ketorolac (TORADOL) injection 15 mg (15 mg Intravenous Given 8/16/24 0342)   iohexol (OMNIPAQUE) 350 MG/ML injection (MULTI-DOSE) 100 mL (100 mL Intravenous Given 8/16/24 0405)   morphine injection 2 mg (2 mg Intravenous Given 8/16/24 0543)   aluminum-magnesium hydroxide-simethicone (MAALOX) oral suspension 30 mL (30 mL Oral Given 8/16/24 0638)       Diagnostic Studies  Results Reviewed       Procedure Component Value Units Date/Time    Comprehensive metabolic panel [[id number]] Collected: 08/16/24 0335    Lab Status: Final result Specimen: Blood from Arm, Right Updated: 08/16/24 0414     Sodium 135 mmol/L      Potassium 4.1 mmol/L      Chloride 106 mmol/L      CO2 22 mmol/L      ANION GAP 7 mmol/L      BUN 11 mg/dL      Creatinine 0.64 mg/dL      Glucose 107 mg/dL      Calcium 8.8 mg/dL      AST 17 U/L      ALT 23 U/L      Alkaline Phosphatase 66 U/L      Total Protein 7.2 g/dL      Albumin 4.1 g/dL      Total Bilirubin 0.41 mg/dL      eGFR 104 ml/min/1.73sq m     Narrative:      National Kid[employer] Foundation guidelines for Chronic Kidney Disease (CKD):     Stage 1 with normal or high GFR (GFR > 90 mL/min/1.73 square meters)    Stage 2 Mild CKD (GFR = 60-89 mL/min/1.73 square meters)    Stage 3A Moderate CKD (GFR = 45-59 mL/min/1.73 square meters)    Stage 3B Moderate CKD (GFR = 30-44 mL/min/1.73 square meters)    Stage 4 Severe CKD (GFR = 15-29 mL/min/1.73 square meters)    Stage 5 End Stage CKD (GFR <15 mL/min/1.73 square meters)  Note: GFR calculation is accurate only with a steady state creatinine    Lipase [[id number]]  (Normal) Collected: 08/16/24 0335    Lab Status: Final result Specimen: Blood from Arm, Right Updated: 08/16/24 0414     Lipase 19 u/L     Urine Microscopic [[id number]]  (Abnormal) Collected:  08/16/24 0335    Lab Status: Final result Specimen: Urine, Clean Catch Updated: 08/16/24 0357     RBC, UA 2-4 /hpf      WBC, UA None Seen /hpf      Epithelial Cells None Seen /hpf      Bacteria, UA None Seen /hpf     CBC and differential [[id number]]  (Abnormal) Collected: 08/16/24 0335    Lab Status: Final result Specimen: Blood from Arm, Right Updated: 08/16/24 0356     WBC 11.34 Thousand/uL      RBC 4.56 Million/uL      Hemoglobin 13.6 g/dL      Hematocrit 40.8 %      MCV 90 fL      MCH 29.8 pg      MCHC 33.3 g/dL      RDW 13.4 %      MPV 11.1 fL      Platelets 213 Thousands/uL      nRBC 0 /100 WBCs      Segmented % 63 %      Immature Grans % 0 %      Lymphocytes % 26 %      Monocytes % 8 %      Eosinophils Relative 2 %      Basophils Relative 1 %      Absolute Neutrophils 7.15 Thousands/µL      Absolute Immature Grans 0.04 Thousand/uL      Absolute Lymphocytes 2.94 Thousands/µL      Absolute Monocytes 0.93 Thousand/µL      Eosinophils Absolute 0.20 Thousand/µL      Basophils Absolute 0.08 Thousands/µL     UA w Reflex to Microscopic w Reflex to Culture [[id number]]  (Abnormal) Collected: 08/16/24 0335    Lab Status: Final result Specimen: Urine, Clean Catch Updated: 08/16/24 0356     Color, UA Colorless     Clarity, UA Clear     Specific Gravity, UA 1.005     pH, UA 6.5     Leukocytes, UA Negative     Nitrite, UA Negative     Protein, UA Negative mg/dl      Glucose, UA Negative mg/dl      Ketones, UA Negative mg/dl      Urobilinogen, UA <2.0 mg/dl      Bilirubin, UA Negative     Occult Blood, UA Small    POCT pregnancy, urine [[id number]]  (Normal) Resulted: 08/16/24 0341    Lab Status: Final result Updated: 08/16/24 0341     EXT Preg Test, Ur Negative     Control Valid                   CT abdomen pelvis with contrast   Final Result by Orville Rodriguez MD (08/16 0442)      Moderate thickening of the proximal duodenum compatible with duodenitis, correlate for peptic ulcer disease.         Workstation performed:  [id number]                    Procedures  Procedures         ED Course                                 SBIRT 22yo+      Flowsheet Row Most Recent Value   Initial Alcohol Screen: US AUDIT-C     1. How often do you have a drink containing alcohol? 0 Filed at: 08/16/2024 0331   2. How many drinks containing alcohol do you have on a typical day you are drinking?  0 Filed at: 08/16/2024 0331   3b. FEMALE Any Age, or MALE 65+: How often do you have 4 or more drinks on one occassion? 0 Filed at: 08/16/2024 0331   Audit-C Score 0 Filed at: 08/16/2024 0331   DAREN: How many times in the past year have you...    Used an illegal drug or used a prescription medication for non-medical reasons? Never Filed at: 08/16/2024 0331                      Medical Decision Making  Patient is a 49-year-old female she is Emergency Department with constipation for several days.  Patient states last bowel movement was 3 days ago.  Patient states as of today she started with nausea and vomiting. Patient admits to right upper quadrant and epigastric tenderness that started today.  DDx including but not limited to: appendicitis, gastroenteritis, gastritis, PUD, GERD, gastroparesis, hepatitis, pancreatitis, colitis, enteritis, food poisoning, mesenteric adenitis, epiploic appendagitis, IBD, IBS, ileus, bowel obstruction, volvulus, cholecystitis, biliary colic, choledocholithiasis, perforated viscus, tumor, splenic etiology, diverticulitis, internal hernia, constipation, pelvic pathology, renal colic, pyelonephritis, UTI.  CBC showed leukocytosis 11.34, CMP within normal limits with no acute relevant abnormalities, UA showed small occult blood but no pyuria or bacteriuria so no antibiotic was given.  CT of the abdomen did show moderate thickening of the proximal lateral consistent with potential peptic ulcer disease, stool was found in the colon but was not obstructed or impacted.  Patient given Maalox, pantoprazole, and Toradol for pain.    Discussed with patient to follow-up with GI in the outpatient setting for potential EGD.  Patient given strict return precautions to return to emergency department if she develops fever, body aches, chills, increasing abdominal pain, increased nausea and vomiting controlled by current medications, rectal bleeding or bloody diarrhea.  Patient verbalized understanding and agrees with current plan.      Amount and/or Complexity of Data Reviewed  Labs: ordered.  Radiology: ordered.    Risk  OTC drugs.  Prescription drug management.                 Disposition  Final diagnoses:   Constipation, unspecified constipation type   PUD (peptic ulcer disease)     Time reflects when diagnosis was documented in both MDM as applicable and the Disposition within this note       Time User Action Codes Description Comment    8/16/2024  6:45 AM Ciaran Pandya [K59.00] Constipation, unspecified constipation type     8/16/2024  6:45 AM Ciaran Pandya [R10.13] Epigastric pain     8/16/2024  6:45 AM Ciaran Pandya [R10.13] Epigastric pain     8/16/2024  6:45 AM Ciaran Pandya [K27.9] PUD (peptic ulcer disease)           ED Disposition       ED Disposition   Discharge    Condition   Stable    Date/Time   Fri Aug 16, 2024 0645    Comment   Stacie Hand discharge to home/self care.                   Follow-up Information    None         Discharge Medication List as of 8/16/2024  6:48 AM        START taking these medications    Details   aluminum-magnesium hydroxide-simethicone (MAALOX) 200-200-20 MG/5ML SUSP Take 30 mL by mouth 4 (four) times a day (before meals and at bedtime), Starting Fri 8/16/2024, Normal      ondansetron (ZOFRAN) 4 mg tablet Take 1 tablet (4 mg total) by mouth every 6 (six) hours, Starting Fri 8/16/2024, Normal      polyethylene glycol (MIRALAX) 17 g packet Take 17 g by mouth daily, Starting Fri 8/16/2024, Normal      pantoprazole (PROTONIX) 40 mg tablet Take 1 tablet (40 mg total) by mouth daily for  20 days, Starting Fri 8/16/2024, Until Thu 9/5/2024, Print           CONTINUE these medications which have NOT CHANGED    Details   meloxicam (MOBIC) 15 mg tablet Take 1 tablet (15 mg total) by mouth daily, Starting Mon 10/5/2020, Normal      methylPREDNISolone 4 MG tablet therapy pack Use as directed on package, Normal                 PDMP Review       None            ED Provider  Electronically Signed by             Ciaran Pandya PA-C  08/16/24 1287

## 2024-09-05 ENCOUNTER — OFFICE VISIT (OUTPATIENT)
Dept: FAMILY MEDICINE CLINIC | Facility: CLINIC | Age: 49
End: 2024-09-05

## 2024-09-05 VITALS
BODY MASS INDEX: 36.12 KG/M2 | HEART RATE: 58 BPM | DIASTOLIC BLOOD PRESSURE: 70 MMHG | WEIGHT: 184 LBS | RESPIRATION RATE: 18 BRPM | SYSTOLIC BLOOD PRESSURE: 104 MMHG | OXYGEN SATURATION: 99 % | TEMPERATURE: 97.9 F | HEIGHT: 60 IN

## 2024-09-05 DIAGNOSIS — K27.9 PUD (PEPTIC ULCER DISEASE): ICD-10-CM

## 2024-09-05 DIAGNOSIS — F41.1 GENERALIZED ANXIETY DISORDER: ICD-10-CM

## 2024-09-05 DIAGNOSIS — K27.9 PEPTIC ULCER DISEASE: Primary | ICD-10-CM

## 2024-09-05 DIAGNOSIS — Z11.59 NEED FOR HEPATITIS C SCREENING TEST: ICD-10-CM

## 2024-09-05 DIAGNOSIS — F32.A DEPRESSION, UNSPECIFIED DEPRESSION TYPE: ICD-10-CM

## 2024-09-05 DIAGNOSIS — Z12.31 ENCOUNTER FOR SCREENING MAMMOGRAM FOR MALIGNANT NEOPLASM OF BREAST: ICD-10-CM

## 2024-09-05 DIAGNOSIS — J98.11 ATELECTASIS: ICD-10-CM

## 2024-09-05 DIAGNOSIS — F31.9 BIPOLAR AFFECTIVE DISORDER, REMISSION STATUS UNSPECIFIED (HCC): ICD-10-CM

## 2024-09-05 DIAGNOSIS — Z72.0 TOBACCO USE: ICD-10-CM

## 2024-09-05 DIAGNOSIS — Z11.4 SCREENING FOR HIV (HUMAN IMMUNODEFICIENCY VIRUS): ICD-10-CM

## 2024-09-05 DIAGNOSIS — E66.9 OBESITY (BMI 30-39.9): ICD-10-CM

## 2024-09-05 DIAGNOSIS — R14.0 GASSINESS: ICD-10-CM

## 2024-09-05 RX ORDER — SIMETHICONE 125 MG
125 CAPSULE ORAL EVERY 6 HOURS PRN
Qty: 30 CAPSULE | Refills: 0 | Status: SHIPPED | OUTPATIENT
Start: 2024-09-05

## 2024-09-05 RX ORDER — HYDROXYZINE HYDROCHLORIDE 25 MG/1
50 TABLET, FILM COATED ORAL EVERY 8 HOURS PRN
Qty: 60 TABLET | Refills: 0 | Status: SHIPPED | OUTPATIENT
Start: 2024-09-05

## 2024-09-05 RX ORDER — PANTOPRAZOLE SODIUM 40 MG/1
40 TABLET, DELAYED RELEASE ORAL DAILY
Qty: 60 TABLET | Refills: 1 | Status: SHIPPED | OUTPATIENT
Start: 2024-09-05 | End: 2025-01-03

## 2024-09-05 RX ORDER — PANTOPRAZOLE SODIUM 40 MG/1
40 TABLET, DELAYED RELEASE ORAL DAILY
Qty: 90 TABLET | Refills: 0 | Status: SHIPPED | OUTPATIENT
Start: 2024-09-05 | End: 2024-09-05

## 2024-09-05 NOTE — PROGRESS NOTES
Ambulatory Visit  Name: Stacie Hand      : 1975      MRN: [id number]  Encounter Provider: Efraín Villalpando MD  Encounter Date: 2024   Encounter department: [facility]    Assessment & Plan   1. Peptic ulcer disease  Assessment & Plan:  Home medication Protonix 40 mg daily   no history of melena or hematochezia    -Patient has appointment with GI set up for   -Discussed testing for H. pylori at this time, however will defer until patient is seen by GI  -applauded for lifestyle modification changes  2. Atelectasis  Assessment & Plan:  CT during last ED visit     - continue Incentive spirometry daily  Orders:  -     Ambulatory Referral to Internal Medicine  3. Tobacco use  Assessment & Plan:  Patient quit smoking 2 weeks ago, prior to that has been smoking half a pack of cigarettes for the past 27 years  Patient does not meet criteria for lung cancer screening   4. Generalized anxiety disorder  Assessment & Plan:  SAL-7 Flowsheet Screening      Flowsheet Row Most Recent Value   Over the last two weeks, how often have you been bothered by the following problems?     Feeling nervous, anxious, or on edge 3   Not being able to stop or control worrying 3   Worrying too much about different things 3   Trouble relaxing  3   Being so restless that it's hard to sit still 3   Becoming easily annoyed or irritable  3   Feeling afraid as if something awful might happen 3   How difficult have these problems made it for you to do your work, take care of things at home, or get along with other people?  Extremely difficult   SAL Score  21             - referral and  referral provided   - start Zoloft 25 mg daily with increase in 25 mg increments per week until next visit, maximum 100 mg per day   - atarax 25mg q8h prn for anxiety, recommend taking before bed   - follow up in 4 weeks  Orders:  -     Ambulatory referral to Psych Services; Future  -     sertraline  (ZOLOFT) 50 mg tablet; Take 1 tablet (50 mg total) by mouth daily  -     hydrOXYzine HCL (ATARAX) 25 mg tablet; Take 2 tablets (50 mg total) by mouth every 8 (eight) hours as needed for itching  5. Depression, unspecified depression type  Assessment & Plan:  PHQ-2/9 Depression Screening    Little interest or pleasure in doing things: 1 - several days  Feeling down, depressed, or hopeless: 2 - more than half the days  Trouble falling or staying asleep, or sleeping too much: 3 - nearly every day  Feeling tired or having little energy: 3 - nearly every day  Poor appetite or overeatin - several days  Feeling bad about yourself - or that you are a failure or have let yourself or your family down: 3 - nearly every day  Trouble concentrating on things, such as reading the newspaper or watching television: 1 - several days  Moving or speaking so slowly that other people could have noticed. Or the opposite - being so fidgety or restless that you have been moving around a lot more than usual: 3 - nearly every day  Thoughts that you would be better off dead, or of hurting yourself in some way: 0 - not at all  PHQ-9 Score: 17  PHQ-9 Interpretation: Moderately severe depression       See assessment and plan per anxiety   Orders:  -     Ambulatory referral to Psych Services; Future  -     sertraline (ZOLOFT) 50 mg tablet; Take 1 tablet (50 mg total) by mouth daily  6. Bipolar affective disorder, remission status unspecified (HCC)  -     Ambulatory referral to Psych Services; Future  7. PUD (peptic ulcer disease)  -     pantoprazole (PROTONIX) 40 mg tablet; Take 1 tablet (40 mg total) by mouth daily  8. Screening for HIV (human immunodeficiency virus)  -     HIV 1/2 AG/AB w Reflex [facility] for 2 yr old and above; Future  9. Need for hepatitis C screening test  -     Hepatitis C Antibody; Future  10. Obesity (BMI 30-39.9)  -     Hemoglobin A1C; Future  -     Lipid Panel with Direct LDL reflex; Future  11. Encounter for screening  "mammogram for malignant neoplasm of breast  -     Mammo screening bilateral w 3d and cad; Future  12. Gassiness  -     simethicone (MYLICON,GAS-X) 125 MG CAPS; Take 1 capsule (125 mg total) by mouth every 6 (six) hours as needed for flatulence       History of Present Illness     50 yo female comes to the office to establish care. Patient was seen in the ED several times in the last 2 weeks, for several reasons, including lightheadedness, chest pain, and constipation.  CT of the chest was positive for atelectasis, CT abdomen for duodenitis and to correlate with peptic ulcer disease.  Patient was started on Protonix daily and she has been taking it regularly.  Symptoms she presented with abdominal pain have slightly improved since discharge.  She has been avoiding spicy food, does not drink alcohol and has quit smoking completely 2 weeks ago.  She has been using an incentive spirometer for her atelectasis at home on a daily basis        Review of Systems   Constitutional:  Negative for chills, fatigue and fever.   HENT:  Negative for congestion, ear pain, rhinorrhea and sore throat.    Eyes:  Negative for visual disturbance.   Respiratory:  Negative for cough, chest tightness and shortness of breath.    Cardiovascular:  Negative for chest pain and palpitations.   Gastrointestinal:  Positive for abdominal pain (epigastric). Negative for constipation, diarrhea, nausea and vomiting.   Genitourinary:  Negative for difficulty urinating, dysuria and hematuria.   Musculoskeletal:  Negative for arthralgias and back pain.   Skin:  Negative for rash.   Neurological:  Negative for seizures, syncope, light-headedness and headaches.   Psychiatric/Behavioral:  The patient is nervous/anxious.    All other systems reviewed and are negative.    Objective     /70 (BP Location: Right arm, Patient Position: Sitting, Cuff Size: Standard)   Pulse 58   Temp 97.9 °F (36.6 °C) (Temporal)   Resp 18   Ht 5' 0.24\" (1.53 m)   Wt 83.5 " kg (184 lb)   SpO2 99%   BMI 35.65 kg/m²     Physical Exam  Vitals and nursing note reviewed.   Constitutional:       General: She is not in acute distress.     Appearance: She is well-developed.   HENT:      Head: Normocephalic and atraumatic.      Nose: Nose normal. No congestion or rhinorrhea.      Mouth/Throat:      Mouth: Mucous membranes are moist.      Pharynx: Oropharynx is clear.   Eyes:      Conjunctiva/sclera: Conjunctivae normal.   Cardiovascular:      Rate and Rhythm: Normal rate and regular rhythm.      Pulses: Normal pulses.      Heart sounds: Normal heart sounds. No murmur heard.  Pulmonary:      Effort: Pulmonary effort is normal. No respiratory distress.      Breath sounds: Normal breath sounds.   Abdominal:      Palpations: Abdomen is soft.      Tenderness: There is abdominal tenderness (epigastric).   Musculoskeletal:         General: No swelling.      Cervical back: Neck supple.      Right lower leg: No edema.      Left lower leg: No edema.   Skin:     General: Skin is warm and dry.      Capillary Refill: Capillary refill takes less than 2 seconds.   Neurological:      Mental Status: She is alert.   Psychiatric:         Mood and Affect: Mood normal.       Administrative Statements   I have spent a total time of 40 minutes in caring for this patient on the day of the visit/encounter including Diagnostic results, Prognosis, Risks and benefits of tx options, Instructions for management, and Patient and family education.

## 2024-09-05 NOTE — ASSESSMENT & PLAN NOTE
Patient quit smoking 2 weeks ago, prior to that has been smoking half a pack of cigarettes for the past 27 years  Patient does not meet criteria for lung cancer screening

## 2024-09-05 NOTE — PATIENT INSTRUCTIONS
Start taking Zoloft 25 mg daily and increase to 50 mg the following week. You may continue to increase in 25 mg increments per week, with a maximum of 100 mg per day.     Atarax - as needed for anxiety - follow instructions on the box      If you would like to be added to the wait list for services within [facility], you will need to contact them directly at [facility] Outpatient Therapy and Psychiatry - [phone]    Outpatient Mental Health Resources     [place] Suicide Prevention Lifeline: Dial #594  If you prefer to text, you can reach the Crisis Text Line by texting “[place]” to 722602    ¿Te encuentras en crisis? Envía un mensaje de texto con la palabra AYUDA al [phone] para comunicarte de manera gratuita con un Consejero de Crisis  Apoyo gratuito las 24 horas del día, los 7 días de la semana, al alcance de tu mano.    [facility] CRISIS for mental health emergencies and/or please go to your local Emergency Department Call [phone] if you or a loved one are in a mental health crisis.  You can Visit https://www.[facility].[place].gov/Services/Mental-Health-In-[place]/Documents/Suicide_Prevention_Hotlines.pdf to find 24/7 crisis phone line for other [facility].    [facility] Mental Health  If you have NO insurance for outpatient Mental Health services you will need to have a liability appointment with [facility] to assess you to qualify for funding. [facility] does NOT provide funding for Medications.  Please call [phone] or [phone] to schedule an intake assessment    [facility]   ? Location 1: [address] [place] [address] [phone]   ? Location 2: [address] [place] [id number] [phone]        ? English only* Serves ages 4+          ? Accepts Medicare and some commercial plans    [facility]   ? [address] [address] [phone]; [phone]  ? Bilingual English/Australian Serves ages 5+   ? Accepts Medical Assistance     [facility]   ? [address] [facility] [facility] [place]  [address] [phone]   ? Bilingual English/Salvadorean Serves ages 14+   ? Accepts Medical Assistance, (Not currently accepting Medicare), & Major Commercial Insurances     [facility]   ? [address] [place] [address] [phone]        ? Bilingual English/Salvadorean Serves ages 6+   ? Accepts Medical Assistance & Commercial Insurance     [facility]   ? Previous [facility] location is closed  ? [address] [facility], [address] [phone]   ? Bilingual English/Salvadorean Serves ages 3+   ? Accepts Medical Assistance & Some Commercial Insurance     [facility]   ? [address] [place] [address] [phone]   ? Bilingual English/Salvadorean Serves ages 5+   ? Accepts Medical Assistance     [facility]ILY [facility]   ? [address] [place] [address] [phone]  ? Bilingual English/Salvadorean Serves ages 3+   ? Accepts Medical Assistance & Some Commercial Insurance     PREVENTIVE MEASURES   ? Location 1: [address] [place] [address] [phone]        ? Location 2: [address] [place] [address]   ? Bilingual English/Salvadorean Serves ages 18+  ? Accepts Medical Assistance    [place] [facility], [facility]  ? [address], [place] [address] [phone]  ? Bilingual English/Salvadorean  ? Accepts Aetna, Cigna, [facility], [facility], [employer], Medicare, [employer], [facility]. No Medical Assistance    [facility] ([phone])  Medicare/Medicare Advantage, Medical Assistance/HealthChoices, Commercial, and self-pay as payment.    [facility]         ? [address], at [facility], [place] [address]; [phone]         ? Bilingual English/Salvadorean Serves ages 6+         ? Accepts Medical Assistance     TEEN HELP LINE  ? 4-009-789-TALK    CRIME VICTIMS [place]       ? [phone]       ? 24/7 [facility] Hotline    [facility]       ? [phone]        ? 24/7 [facility] Hotline    www.psychologytoday.com is a resource to find psychotherapy providers, patients can filter therapist search list based on a number of criteria including language, specialty, gender, insurance, etc. Individuals seeking will need to reach out to perspective providers through information in the directory. You are encouraged to contact multiple providers to given that many providers have a significant wait list for services as well as to find a provider is a good fit for you!    [facility] is an organization of families, friends and individuals whose lives have been affected by mental illness. Together, we advocate for better lives for those individuals who have a m)ental illness. Visit: [email or web address].org to learn more or to search for local support resources including qualified mental health providers.

## 2024-09-05 NOTE — ASSESSMENT & PLAN NOTE
Home medication Protonix 40 mg daily   no history of melena or hematochezia    -Patient has appointment with GI set up for September 9  -Discussed testing for H. pylori at this time, however will defer until patient is seen by GI  -applauded for lifestyle modification changes

## 2024-09-05 NOTE — ASSESSMENT & PLAN NOTE
SAL-7 Flowsheet Screening      Flowsheet Row Most Recent Value   Over the last two weeks, how often have you been bothered by the following problems?     Feeling nervous, anxious, or on edge 3   Not being able to stop or control worrying 3   Worrying too much about different things 3   Trouble relaxing  3   Being so restless that it's hard to sit still 3   Becoming easily annoyed or irritable  3   Feeling afraid as if something awful might happen 3   How difficult have these problems made it for you to do your work, take care of things at home, or get along with other people?  Extremely difficult   SAL Score  21             - referral and  referral provided   - start Zoloft 25 mg daily with increase in 25 mg increments per week until next visit, maximum 100 mg per day   - atarax 25mg q8h prn for anxiety, recommend taking before bed   - follow up in 4 weeks

## 2024-09-05 NOTE — ASSESSMENT & PLAN NOTE
PHQ-2/9 Depression Screening    Little interest or pleasure in doing things: 1 - several days  Feeling down, depressed, or hopeless: 2 - more than half the days  Trouble falling or staying asleep, or sleeping too much: 3 - nearly every day  Feeling tired or having little energy: 3 - nearly every day  Poor appetite or overeatin - several days  Feeling bad about yourself - or that you are a failure or have let yourself or your family down: 3 - nearly every day  Trouble concentrating on things, such as reading the newspaper or watching television: 1 - several days  Moving or speaking so slowly that other people could have noticed. Or the opposite - being so fidgety or restless that you have been moving around a lot more than usual: 3 - nearly every day  Thoughts that you would be better off dead, or of hurting yourself in some way: 0 - not at all  PHQ-9 Score: 17  PHQ-9 Interpretation: Moderately severe depression       See assessment and plan per anxiety

## 2024-09-09 ENCOUNTER — TELEPHONE (OUTPATIENT)
Age: 49
End: 2024-09-09

## 2024-09-09 NOTE — TELEPHONE ENCOUNTER
Reached out to pt in regards to routine referral and adding to proper wait list. LVM for pt to contact intake dept.     First attempt    35.5

## 2024-09-10 ENCOUNTER — TELEPHONE (OUTPATIENT)
Dept: GASTROENTEROLOGY | Facility: MEDICAL CENTER | Age: 49
End: 2024-09-10

## 2024-09-10 ENCOUNTER — CONSULT (OUTPATIENT)
Dept: GASTROENTEROLOGY | Facility: MEDICAL CENTER | Age: 49
End: 2024-09-10
Payer: COMMERCIAL

## 2024-09-10 VITALS
HEIGHT: 60 IN | OXYGEN SATURATION: 98 % | WEIGHT: 183 LBS | SYSTOLIC BLOOD PRESSURE: 116 MMHG | BODY MASS INDEX: 35.93 KG/M2 | HEART RATE: 65 BPM | DIASTOLIC BLOOD PRESSURE: 60 MMHG

## 2024-09-10 DIAGNOSIS — Z12.11 SCREENING FOR COLON CANCER: Primary | ICD-10-CM

## 2024-09-10 DIAGNOSIS — R93.5 ABNORMAL CT OF THE ABDOMEN: ICD-10-CM

## 2024-09-10 DIAGNOSIS — R10.13 EPIGASTRIC PAIN: ICD-10-CM

## 2024-09-10 DIAGNOSIS — K27.9 PUD (PEPTIC ULCER DISEASE): ICD-10-CM

## 2024-09-10 PROCEDURE — 99204 OFFICE O/P NEW MOD 45 MIN: CPT | Performed by: NURSE PRACTITIONER

## 2024-09-10 RX ORDER — BISACODYL 5 MG/1
TABLET, DELAYED RELEASE ORAL
Qty: 4 TABLET | Refills: 0 | Status: SHIPPED | OUTPATIENT
Start: 2024-09-10

## 2024-09-10 RX ORDER — POLYETHYLENE GLYCOL 3350, SODIUM CHLORIDE, SODIUM BICARBONATE, POTASSIUM CHLORIDE 420; 11.2; 5.72; 1.48 G/4L; G/4L; G/4L; G/4L
4000 POWDER, FOR SOLUTION ORAL ONCE
Qty: 4000 ML | Refills: 0 | Status: SHIPPED | OUTPATIENT
Start: 2024-09-10 | End: 2024-09-10

## 2024-09-10 NOTE — TELEPHONE ENCOUNTER
Procedure: Colon/EGD  Date: 10/04/2024  Physician performing: Dr. Coon  Location of procedure:  [place]  Instructions given to patient: Golytely   Diabetic: N/A  Clearances: N/A    Patient will be placed on cancellation list for an earlier date for her follow up

## 2024-09-10 NOTE — PROGRESS NOTES
[facility] - Outpatient Consultation  Stacie Hand 49 y.o. female MRN: [id number]  Encounter: [id number]          ASSESSMENT AND PLAN:    Stacie Hand is a 49 y.o. female who presents with complaint of epigastric pain and GERD.    1.  Abnormal CT abdomen  2.  Epigastric pain  3.  GERD    Started several years ago with chronic heartburn/reflux.  Worsened over the past few months.  Recent CT abdomen pelvis 8/16/2024 noted moderate thickening of the proximal duodenum compatible with duodenitis, correlate for peptic ulcer disease.  She was placed on pantoprazole 40 mg daily.  Feels better overall but rates pain 5/10 but was a 10/10.  She has decreased her caffeine to 1 cup/day.  Was drinking up to 3 cups/day.  She is eliminated smoking, alcohol and spicy foods.  Rare NSAID use.  Denies any nausea, vomiting or dysphagia.  Does report regurgitation.  No weight loss.  Never had an EGD.  Will rule out PUD, gastritis/esophagitis, celiac disease and H. pylori.    -Stool for H. pylori off PPI for 2 weeks, celiac panel  -Can use Pepcid 20 mg twice daily in place of PPI while off for 2 weeks of PPI  -EGD  -Antireflux diet  -Follow-up in office after testing      4.  Colon cancer screening    BMs are brown and formed daily to every other day.  Denies any melena or hematochezia.  Never had a colonoscopy.  No family history of any colon cancers or polyps.    -Colonoscopy GoLytely/Dulcolax prep    I reviewed with patient/family potential risks of endoscopic evaluation including possible infection, bleeding or perforation.  Patient/family verbalized understanding of potential risks and agreed to procedure(s).    ______________________________________________________________________    HPI:    Stacie Hand is a 49 y.o. female who presents with complaint of epigastric pain and GERD.  She has a past medical history of bipolar disorder.      Started several years ago with chronic heartburn/reflux.   Worsened over the past few months.  Recent CT abdomen pelvis 2024 noted moderate thickening of the proximal duodenum compatible with duodenitis, correlate for peptic ulcer disease.  She was placed on pantoprazole 40 mg daily.  Feels better overall but rates pain 5/10 but was a 10/10.  She has decreased her caffeine to 1 cup/day.  Was drinking up to 3 cups/day.  She is eliminated smoking, alcohol and spicy foods.  Rare NSAID use.  Denies any nausea, vomiting or dysphagia.  Does report regurgitation.  No weight loss.  Never had an EGD.    BMs are brown and formed daily to every other day.  Denies any melena or hematochezia.  Never had a colonoscopy.  No family history of any colon cancers or polyps.        CT abdomen pelvis 2024-moderate thickening of the proximal duodenum compatible with duodenitis, correlate for peptic ulcer disease.    Labs 2024-CMP normal, CBC normal other than WBCs 11.34.    REVIEW OF SYSTEMS:    CONSTITUTIONAL: Denies any fever, chills, rigors, and weight loss.  HEENT: No earache or tinnitus. Denies hearing loss or visual disturbances.  CARDIOVASCULAR: No chest pain or palpitations.   RESPIRATORY: Denies any cough, hemoptysis, shortness of breath or dyspnea on exertion.  GASTROINTESTINAL: As noted in the History of Present Illness.   GENITOURINARY: No problems with urination. Denies any hematuria or dysuria.  NEUROLOGIC: No dizziness or vertigo, denies headaches.   MUSCULOSKELETAL: Denies any muscle or joint pain.   SKIN: Denies skin rashes or itching.   ENDOCRINE: Denies excessive thirst. Denies intolerance to heat or cold.  PSYCHOSOCIAL: Denies depression or anxiety. Denies any recent memory loss.       Historical Information   Past Medical History:   Diagnosis Date    Vertigo      Past Surgical History:   Procedure Laterality Date     SECTION       Social History   Social History     Substance and Sexual Activity   Alcohol Use No     Social History     Substance and  Sexual Activity   Drug Use No     Social History     Tobacco Use   Smoking Status Former    Current packs/day: 0.25    Types: Cigarettes   Smokeless Tobacco Never     History reviewed. No pertinent family history.    Meds/Allergies       Current Outpatient Medications:     aluminum-magnesium hydroxide-simethicone (MAALOX) 200-200-20 MG/5ML SUSP    hydrOXYzine HCL (ATARAX) 25 mg tablet    ondansetron (ZOFRAN) 4 mg tablet    pantoprazole (PROTONIX) 40 mg tablet    polyethylene glycol (MIRALAX) 17 g packet    sertraline (ZOLOFT) 50 mg tablet    simethicone (MYLICON,GAS-X) 125 MG CAPS    No Known Allergies        Objective     There were no vitals taken for this visit. There is no height or weight on file to calculate BMI.        PHYSICAL EXAM:      General Appearance:   Alert, cooperative, no distress   HEENT:   Normocephalic, atraumatic, anicteric.     Neck:  Supple, symmetrical, trachea midline   Lungs:   Clear to auscultation bilaterally; no rales, rhonchi or wheezing; respirations unlabored    Heart::   Regular rate and rhythm; no murmur, rub, or gallop.   Abdomen:   Soft, non-tender, non-distended; normal bowel sounds; no masses, no organomegaly    Genitalia:   Deferred    Rectal:   Deferred    Extremities:  No cyanosis, clubbing or edema    Pulses:  2+ and symmetric    Skin:  No jaundice, rashes, or lesions    Lymph nodes:  No palpable cervical lymphadenopathy        Lab Results:   No visits with results within 1 Day(s) from this visit.   Latest known visit with results is:   Admission on 08/16/2024, Discharged on 08/16/2024   Component Date Value    WBC 08/16/2024 11.34 (H)     RBC 08/16/2024 4.56     Hemoglobin 08/16/2024 13.6     Hematocrit 08/16/2024 40.8     MCV 08/16/2024 90     MCH 08/16/2024 29.8     MCHC 08/16/2024 33.3     RDW 08/16/2024 13.4     MPV 08/16/2024 11.1     Platelets 08/16/2024 213     nRBC 08/16/2024 0     Segmented % 08/16/2024 63     Immature Grans % 08/16/2024 0     Lymphocytes %  08/16/2024 26     Monocytes % 08/16/2024 8     Eosinophils Relative 08/16/2024 2     Basophils Relative 08/16/2024 1     Absolute Neutrophils 08/16/2024 7.15     Absolute Immature Grans 08/16/2024 0.04     Absolute Lymphocytes 08/16/2024 2.94     Absolute Monocytes 08/16/2024 0.93     Eosinophils Absolute 08/16/2024 0.20     Basophils Absolute 08/16/2024 0.08     Sodium 08/16/2024 135     Potassium 08/16/2024 4.1     Chloride 08/16/2024 106     CO2 08/16/2024 22     ANION GAP 08/16/2024 7     BUN 08/16/2024 11     Creatinine 08/16/2024 0.64     Glucose 08/16/2024 107     Calcium 08/16/2024 8.8     AST 08/16/2024 17     ALT 08/16/2024 23     Alkaline Phosphatase 08/16/2024 66     Total Protein 08/16/2024 7.2     Albumin 08/16/2024 4.1     Total Bilirubin 08/16/2024 0.41     eGFR 08/16/2024 104     Lipase 08/16/2024 19     Color, UA 08/16/2024 Colorless     Clarity, UA 08/16/2024 Clear     Specific Gravity, UA 08/16/2024 1.005     pH, UA 08/16/2024 6.5     Leukocytes, UA 08/16/2024 Negative     Nitrite, UA 08/16/2024 Negative     Protein, UA 08/16/2024 Negative     Glucose, UA 08/16/2024 Negative     Ketones, UA 08/16/2024 Negative     Urobilinogen, UA 08/16/2024 <2.0     Bilirubin, UA 08/16/2024 Negative     Occult Blood, UA 08/16/2024 Small (A)     EXT Preg Test, Ur 08/16/2024 Negative     Control 08/16/2024 Valid     RBC, UA 08/16/2024 2-4 (A)     WBC, UA 08/16/2024 None Seen     Epithelial Cells 08/16/2024 None Seen     Bacteria, UA 08/16/2024 None Seen        Lab Results   Component Value Date    WBC 11.34 (H) 08/16/2024    HGB 13.6 08/16/2024    HCT 40.8 08/16/2024    MCV 90 08/16/2024     08/16/2024       Lab Results   Component Value Date     (L) 09/09/2015    SODIUM 135 08/16/2024    K 4.1 08/16/2024     08/16/2024    CO2 22 08/16/2024    ANIONGAP 8 09/09/2015    AGAP 7 08/16/2024    BUN 11 08/16/2024    CREATININE 0.64 08/16/2024    GLUC 107 08/16/2024    CALCIUM 8.8 08/16/2024    AST 17  "08/16/2024    ALT 23 08/16/2024    ALKPHOS 66 08/16/2024    PROT 6.7 09/09/2015    TP 7.2 08/16/2024    BILITOT 0.23 09/09/2015    TBILI 0.41 08/16/2024    EGFR 104 08/16/2024       No results found for: \"CRP\"    Lab Results   Component Value Date    CSV9FRWNPTBB 0.858 08/14/2024       No results found for: \"IRON\", \"TIBC\", \"FERRITIN\"    Radiology Results:   CT abdomen pelvis with contrast    Result Date: 8/16/2024  Narrative: CT ABDOMEN AND PELVIS WITH IV CONTRAST INDICATION: Epigastric abdominal pain. COMPARISON: None. TECHNIQUE: CT examination of the abdomen and pelvis was performed. Multiplanar 2D reformatted images were created from the source data. This examination, like all CT scans performed in the [facility], was performed utilizing techniques to minimize radiation dose exposure, including the use of iterative reconstruction and automated exposure control. Radiation dose length product (DLP) for this visit: 560 mGy-cm IV Contrast: 100 mL of iohexol (OMNIPAQUE) Enteric Contrast: Not administered. FINDINGS: ABDOMEN LOWER CHEST: Trace atelectasis/scarring of the lung bases. LIVER/BILIARY TREE: Unremarkable. GALLBLADDER: No calcified gallstones. No pericholecystic inflammatory change. SPLEEN: Unremarkable. PANCREAS: Unremarkable. ADRENAL GLANDS: Unremarkable. KIDNEYS/URETERS: Unremarkable. No hydronephrosis. STOMACH AND BOWEL: Moderate thickening of the proximal duodenum (series 2, image 60). 9 mm duodenal diverticulum. No bowel obstruction. APPENDIX: Normal. ABDOMINOPELVIC CAVITY: No ascites. No pneumoperitoneum. No lymphadenopathy. VESSELS: Unremarkable for patient's age. PELVIS REPRODUCTIVE ORGANS: Corpus lutea within bilateral ovaries are seen URINARY BLADDER: Unremarkable. ABDOMINAL WALL/INGUINAL REGIONS: Unremarkable. BONES: No acute fracture or suspicious osseous lesion.     Impression: Moderate thickening of the proximal duodenum compatible with duodenitis, correlate for peptic ulcer " disease. Workstation performed: [id number]     CT chest without contrast    Result Date: 8/14/2024  Narrative: CT CHEST WITHOUT IV CONTRAST INDICATION: Chest pain, sob. COMPARISON: Chest x-ray from 9/1/2015. TECHNIQUE: CT examination of the chest was performed without intravenous contrast. Multiplanar 2D reformatted images were created from the source data. This examination, like all CT scans performed in the [facility], was performed utilizing techniques to minimize radiation dose exposure, including the use of iterative reconstruction and automated exposure control. Radiation dose length product (DLP) for this visit: 251 mGy-cm FINDINGS: LUNGS: Scattered areas of subsegmental atelectasis at the lung bases and in the right middle lobe. No lung nodule. No consolidation. No endotracheal or endobronchial lesion identified. PLEURA: Unremarkable. HEART/GREAT VESSELS: Heart is unremarkable for patient's age. No thoracic aortic aneurysm. MEDIASTINUM AND SANDOVAL: Unremarkable. CHEST WALL AND LOWER NECK: Unremarkable. VISUALIZED STRUCTURES IN THE UPPER ABDOMEN: Unremarkable. OSSEOUS STRUCTURES: No acute fracture or destructive osseous lesion.     Impression: Scattered areas of subsegmental atelectasis. Workstation performed: [id number]     CT head without contrast    Result Date: 8/14/2024  Narrative: CT BRAIN - WITHOUT CONTRAST INDICATION:   Dizziness, intermittent bilateral vision blurriness. COMPARISON: CT brain from 9/1/2015. TECHNIQUE:  CT examination of the brain was performed.  Multiplanar 2D reformatted images were created from the source data. Radiation dose length product (DLP) for this visit:  883 mGy-cm .  This examination, like all CT scans performed in the [facility], was performed utilizing techniques to minimize radiation dose exposure, including the use of iterative reconstruction and automated exposure control. IMAGE QUALITY:  Diagnostic. FINDINGS: PARENCHYMA:  No intracranial  mass, mass effect or midline shift. No CT signs of acute infarction.  No acute parenchymal hemorrhage. VENTRICLES AND EXTRA-AXIAL SPACES:  Normal for the patient's age. VISUALIZED ORBITS: Normal visualized orbits. PARANASAL SINUSES: Normal visualized paranasal sinuses. CALVARIUM AND EXTRACRANIAL SOFT TISSUES:  Normal.     Impression: No acute intracranial abnormality. Workstation performed: [id number]

## 2024-09-10 NOTE — PATIENT INSTRUCTIONS
Pepcid ( famotidine) 20mg  2 x per day and take in place of the Pantoprazole for 2 weeks before stool test.

## 2024-09-12 NOTE — TELEPHONE ENCOUNTER
Contacted patient in regards to Routine Referral in attempts to verify patient's needs of services and add patient to proper wait list. spoke with patient whom stated they were interested. Pt was added to MM and TT wait list.

## 2024-09-24 ENCOUNTER — ANESTHESIA EVENT (OUTPATIENT)
Dept: ANESTHESIOLOGY | Facility: HOSPITAL | Age: 49
End: 2024-09-24

## 2024-09-24 ENCOUNTER — ANESTHESIA (OUTPATIENT)
Dept: ANESTHESIOLOGY | Facility: HOSPITAL | Age: 49
End: 2024-09-24

## 2024-09-30 ENCOUNTER — APPOINTMENT (OUTPATIENT)
Dept: LAB | Facility: HOSPITAL | Age: 49
End: 2024-09-30
Payer: COMMERCIAL

## 2024-09-30 ENCOUNTER — TELEPHONE (OUTPATIENT)
Age: 49
End: 2024-09-30

## 2024-09-30 DIAGNOSIS — Z11.4 SCREENING FOR HIV (HUMAN IMMUNODEFICIENCY VIRUS): ICD-10-CM

## 2024-09-30 DIAGNOSIS — E66.9 OBESITY (BMI 30-39.9): ICD-10-CM

## 2024-09-30 DIAGNOSIS — Z11.59 NEED FOR HEPATITIS C SCREENING TEST: ICD-10-CM

## 2024-09-30 DIAGNOSIS — R10.13 EPIGASTRIC PAIN: ICD-10-CM

## 2024-09-30 LAB
CHOLEST SERPL-MCNC: 201 MG/DL
EST. AVERAGE GLUCOSE BLD GHB EST-MCNC: 117 MG/DL
HBA1C MFR BLD: 5.7 %
HCV AB SER QL: NORMAL
HDLC SERPL-MCNC: 54 MG/DL
HIV 1+2 AB+HIV1 P24 AG SERPL QL IA: NORMAL
HIV 2 AB SERPL QL IA: NORMAL
HIV1 AB SERPL QL IA: NORMAL
HIV1 P24 AG SERPL QL IA: NORMAL
IGA SERPL-MCNC: 244 MG/DL (ref 66–433)
LDLC SERPL CALC-MCNC: 130 MG/DL (ref 0–100)
TRIGL SERPL-MCNC: 85 MG/DL

## 2024-09-30 PROCEDURE — 86803 HEPATITIS C AB TEST: CPT

## 2024-09-30 PROCEDURE — 86364 TISS TRNSGLTMNASE EA IG CLAS: CPT

## 2024-09-30 PROCEDURE — 80061 LIPID PANEL: CPT

## 2024-09-30 PROCEDURE — 83036 HEMOGLOBIN GLYCOSYLATED A1C: CPT

## 2024-09-30 PROCEDURE — 87389 HIV-1 AG W/HIV-1&-2 AB AG IA: CPT

## 2024-09-30 PROCEDURE — 82784 ASSAY IGA/IGD/IGG/IGM EACH: CPT

## 2024-09-30 PROCEDURE — 87338 HPYLORI STOOL AG IA: CPT

## 2024-09-30 PROCEDURE — 36415 COLL VENOUS BLD VENIPUNCTURE: CPT

## 2024-09-30 NOTE — TELEPHONE ENCOUNTER
Confirming Upcoming Procedure: colon/egd on 10/04/2024  Physician performing: Dr Coon   Location of procedure:  [facility]   Prep: Golytely    Explain prep to patient, she is doing her lab test today

## 2024-10-02 ENCOUNTER — TELEPHONE (OUTPATIENT)
Age: 49
End: 2024-10-02

## 2024-10-02 DIAGNOSIS — A04.8 H. PYLORI INFECTION: Primary | ICD-10-CM

## 2024-10-02 LAB
H PYLORI AG STL QL IA: POSITIVE
TTG IGA SER-ACNC: <2 U/ML (ref 0–3)

## 2024-10-02 RX ORDER — TETRACYCLINE HYDROCHLORIDE 500 MG/1
500 CAPSULE ORAL
Qty: 56 CAPSULE | Refills: 0 | Status: SHIPPED | OUTPATIENT
Start: 2024-10-02 | End: 2024-10-16

## 2024-10-02 RX ORDER — BISMUTH SUBSALICYLATE 262 MG/1
262 TABLET, CHEWABLE ORAL
Qty: 56 TABLET | Refills: 0 | Status: SHIPPED | OUTPATIENT
Start: 2024-10-02

## 2024-10-02 RX ORDER — METRONIDAZOLE 250 MG/1
250 TABLET ORAL
Qty: 56 TABLET | Refills: 0 | Status: SHIPPED | OUTPATIENT
Start: 2024-10-02 | End: 2024-10-16

## 2024-10-02 NOTE — TELEPHONE ENCOUNTER
I spoke with patient and reviewed + H Pylori test result, medications, follow up testing. Patient advised that she is not to start treatment until after her upcoming EGD/colonoscopy 10/4/24. Patient has pantoprazole on hand at home and will increase to BID dosing for 14 days, restart daily dose afterwards for two weeks and then hold again for two weeks for upcoming repeat stool study. Patient's MyChart is not functioning and she requested that H Pylori information be emailed along with medication instructions for her to reference. I have attached medication orders, please sign off.

## 2024-10-03 ENCOUNTER — OFFICE VISIT (OUTPATIENT)
Dept: FAMILY MEDICINE CLINIC | Facility: CLINIC | Age: 49
End: 2024-10-03

## 2024-10-03 ENCOUNTER — TELEPHONE (OUTPATIENT)
Dept: GASTROENTEROLOGY | Facility: CLINIC | Age: 49
End: 2024-10-03

## 2024-10-03 VITALS
HEART RATE: 64 BPM | SYSTOLIC BLOOD PRESSURE: 100 MMHG | DIASTOLIC BLOOD PRESSURE: 68 MMHG | WEIGHT: 185.8 LBS | BODY MASS INDEX: 36.48 KG/M2 | OXYGEN SATURATION: 99 % | RESPIRATION RATE: 16 BRPM | HEIGHT: 60 IN | TEMPERATURE: 97.1 F

## 2024-10-03 DIAGNOSIS — Z00.00 ANNUAL PHYSICAL EXAM: ICD-10-CM

## 2024-10-03 DIAGNOSIS — F41.9 ANXIETY DISORDER, UNSPECIFIED TYPE: Primary | ICD-10-CM

## 2024-10-03 DIAGNOSIS — J98.11 ATELECTASIS: ICD-10-CM

## 2024-10-03 DIAGNOSIS — L65.9 HAIR LOSS: ICD-10-CM

## 2024-10-03 DIAGNOSIS — H61.22 EXCESSIVE CERUMEN IN LEFT EAR CANAL: ICD-10-CM

## 2024-10-03 NOTE — ASSESSMENT & PLAN NOTE
SAL screen unchanged from last- 21  At last visit  referral and mental health resources provided    Plan  Recommend starting Zoloft 25 mg daily with increasing 25 mg increments per week until the next visit as well as Atarax 25 mg every 8 hours for anxiety  Follow-up in 4 weeks

## 2024-10-03 NOTE — PROGRESS NOTES
Adult Annual Physical  Name: Stacie Hand      : 1975      MRN: [id number]  Encounter Provider: Efraín Villalpando MD  Encounter Date: 10/3/2024   Encounter department: [facility] [place]    Assessment & Plan  Anxiety disorder, unspecified type  SAL screen unchanged from last- 21  At last visit  referral and mental health resources provided    Plan  Recommend starting Zoloft 25 mg daily with increasing 25 mg increments per week until the next visit as well as Atarax 25 mg every 8 hours for anxiety  Follow-up in 4 weeks         Excessive cerumen in both ear canals    Orders:    carbamide peroxide (DEBROX) 6.5 % otic solution; Administer 5 drops into both ears 2 (two) times a day    Hair loss  Mild hair loss in the past several months  Discussed conservative measures and over-the-counter supplements, such as vitamin B 7    Orders:    minoxidil (ROGAINE) 2 % external solution; Apply topically 2 (two) times a day    Annual physical exam         Immunizations and preventive care screenings were discussed with patient today. Appropriate education was printed on patient's after visit summary.    Counseling:  Alcohol/drug use: discussed moderation in alcohol intake, the recommendations for healthy alcohol use, and avoidance of illicit drug use.  Dental Health: discussed importance of regular tooth brushing, flossing, and dental visits.  Injury prevention: discussed safety/seat belts, safety helmets, smoke detectors, carbon monoxide detectors, and smoking near bedding or upholstery.  Sexual health: discussed sexually transmitted diseases, partner selection, use of condoms, avoidance of unintended pregnancy, and contraceptive alternatives.  Exercise: the importance of regular exercise/physical activity was discussed. Recommend exercise 3-5 times per week for at least 30 minutes.          History of Present Illness     Adult Annual Physical:  Patient presents for annual physical.  "This is a very pleasant 49-year-old female patient with a past medical history of anxiety and recent positive H. pylori comes to the office for an annual physical.  Patient inquires regarding colonoscopy/EGD perforation which is scheduled for tomorrow.  Otherwise she reports that she is currently not taking any medications in the setting of preparation. .     Diet and Physical Activity:  - Diet/Nutrition: well balanced diet.  - Exercise: walking. 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 worse due to anticipation of the EGD/colonoscopy, poor at baseline as well due to stress  - Hearing: normal hearing bilateral ears.  - Vision: wears glasses.  - Dental: no dental visits for > 1 year.    /GYN Health:  - Follows with GYN: no.   - Menopause: premenopausal.   - History of STDs: no    Review of Systems   Constitutional:  Negative for chills, fatigue and fever.   HENT:  Negative for congestion, ear pain, rhinorrhea and sore throat.    Eyes:  Negative for visual disturbance.   Respiratory:  Negative for cough, chest tightness and shortness of breath.    Cardiovascular:  Negative for chest pain and palpitations.   Gastrointestinal:  Negative for abdominal pain, constipation, diarrhea, nausea and vomiting.   Genitourinary:  Negative for difficulty urinating, dysuria and hematuria.   Musculoskeletal:  Negative for arthralgias and back pain.   Skin:  Negative for rash.   Neurological:  Negative for seizures, syncope, light-headedness and headaches.   All other systems reviewed and are negative.        Objective     /68 (BP Location: Left arm, Patient Position: Sitting, Cuff Size: Standard)   Pulse 64   Temp (!) 97.1 °F (36.2 °C) (Temporal)   Resp 16   Ht 5' 0.2\" (1.529 m)   Wt 84.3 kg (185 lb 12.8 oz)   SpO2 99%   BMI 36.05 kg/m²     Physical Exam  Vitals and nursing note reviewed. "   Constitutional:       General: She is not in acute distress.     Appearance: She is well-developed.   HENT:      Head: Normocephalic and atraumatic.      Right Ear: Tympanic membrane, ear canal and external ear normal. There is no impacted cerumen.      Left Ear: Tympanic membrane, ear canal and external ear normal. There is impacted cerumen.      Nose: No congestion or rhinorrhea.      Mouth/Throat:      Mouth: Mucous membranes are moist.      Pharynx: Oropharynx is clear.   Eyes:      Conjunctiva/sclera: Conjunctivae normal.   Cardiovascular:      Rate and Rhythm: Normal rate and regular rhythm.      Heart sounds: No murmur heard.  Pulmonary:      Effort: Pulmonary effort is normal. No respiratory distress.      Breath sounds: Normal breath sounds.   Abdominal:      Palpations: Abdomen is soft.      Tenderness: There is no abdominal tenderness.   Musculoskeletal:         General: No swelling.      Cervical back: Neck supple.   Skin:     General: Skin is warm and dry.      Capillary Refill: Capillary refill takes less than 2 seconds.   Neurological:      General: No focal deficit present.      Mental Status: She is alert.   Psychiatric:         Mood and Affect: Mood normal.

## 2024-10-03 NOTE — PROGRESS NOTES
"Ambulatory Visit  Name: Stacie Hand      : 1975      MRN: [id number]  Encounter Provider: Efraín Villalpando MD  Encounter Date: 10/3/2024   Encounter department: [facility] [place]    Assessment & Plan  Excessive cerumen in both ear canals    Orders:  •  carbamide peroxide (DEBROX) 6.5 % otic solution; Administer 5 drops into both ears 2 (two) times a day       History of Present Illness   {Disappearing Hyperlinks I Encounters * My Last Note * Since Last Visit * History :42844}  She reports she has a problem with earwax. She reports she has been having difficulty hearing.       {History obtained from (Optional):42560}  Review of Systems  {Select to Display PMH (Optional):63856}      Objective   {Disappearing Hyperlinks   Review Vitals * Enter New Vitals * Results Review * Labs * Imaging * Cardiology * Procedures * Lung Cancer Screening * Surgical eConsent :04957}  /68 (BP Location: Left arm, Patient Position: Sitting, Cuff Size: Standard)   Pulse 64   Temp (!) 97.1 °F (36.2 °C) (Temporal)   Resp 16   Ht 5' 0.2\" (1.529 m)   Wt 84.3 kg (185 lb 12.8 oz)   SpO2 99%   BMI 36.05 kg/m²     Physical Exam  {Administrative / Billing Section (Optional):28273}  "

## 2024-10-03 NOTE — TELEPHONE ENCOUNTER
Pt called to review prep instructions. Prep instructions were emailed to pt. Pt will call pharmacy to make sure they still have prep and did not put it back on shelf.

## 2024-10-04 NOTE — TELEPHONE ENCOUNTER
Pt calling in she has questions regarding h pylori regimen I reviewed with pt. She understood. Pt thought provider needed her off PPI for 2 weeks due to procedures- I advised that was for h pylori stool test. She understood and was off PPI at time of test.

## 2024-10-05 DIAGNOSIS — L65.9 HAIR LOSS: ICD-10-CM

## 2024-10-08 RX ORDER — MINOXIDIL 2 G/100ML
SOLUTION TOPICAL 2 TIMES DAILY
Qty: 60 ML | Refills: 3 | Status: SHIPPED | OUTPATIENT
Start: 2024-10-08

## 2024-10-08 NOTE — TELEPHONE ENCOUNTER
Confirming Upcoming Procedure: colonoscopy on 10/18/2024  Physician performing: Dr Coon  [facility] of [facility]   Prep: Golytely    Called patient a couldn't communicate with her.no voicemail

## 2024-10-18 ENCOUNTER — TELEPHONE (OUTPATIENT)
Age: 49
End: 2024-10-18

## 2024-10-18 RX ORDER — SODIUM CHLORIDE 9 MG/ML
125 INJECTION, SOLUTION INTRAVENOUS CONTINUOUS
OUTPATIENT
Start: 2024-10-18

## 2024-10-18 NOTE — TELEPHONE ENCOUNTER
07/25/2022  Chava Bah is a 50 y.o., male.      Pre-op Assessment    I have reviewed the Patient Summary Reports.     I have reviewed the Nursing Notes. I have reviewed the NPO Status.   I have reviewed the Medications.     Review of Systems  Anesthesia Hx:  No problems with previous Anesthesia    Social:  Non-Smoker    Cardiovascular:   Denies Hypertension.  Denies MI.    Denies Angina.  Denies CHF.    Pulmonary:   Denies COPD.  Denies Asthma.    Renal/:   Chronic Renal Disease renal calculi Single kidney, only has the left, per pt   Hepatic/GI:   GERD, poorly controlled    Neurological:   Denies CVA. Denies Seizures.    Endocrine:   Denies Diabetes. Denies Hypothyroidism. Denies Hyperthyroidism.  Denies Obesity / BMI > 30      Physical Exam  General: Well nourished, Cooperative, Alert and Oriented    Airway:  Mallampati: I   Mouth Opening: Normal  TM Distance: Normal  Tongue: Normal  Neck ROM: Normal ROM    Dental:  Intact        Anesthesia Plan  Type of Anesthesia, risks & benefits discussed:    Anesthesia Type: MAC  Intra-op Monitoring Plan: Standard ASA Monitors  Induction:  IV  Informed Consent: Informed consent signed with the Patient and all parties understand the risks and agree with anesthesia plan.  All questions answered.   ASA Score: 2  Day of Surgery Review of History & Physical: H&P Update referred to the surgeon/provider.    Ready For Surgery From Anesthesia Perspective.     .      
Patients GI provider:  JULIANNA AGUILLON    Number to return call: ([phone] (     Reason for call: Pt rescheduled procedure for today and is requesitng different prep. Patient was unable to tolerate golytely, stating it made her feel sick,      Please advise of different prep and contact patient with all prep directions.  Scheduled procedure/appointment date if applicable: 11/20/2024      
Rescheduled date of EGD/colonoscopy (as of today): 11/20/2024  Physician performing EGD/colonoscopy: DR MILLER  Location of EGD[place]colonoscopy: [place]  Desired bowel prep reviewed with patienT TBA  Instructions reviewed with patient by: TBA  Clearances:          Patient rescheduled due to menstral, patient preferred to reschedule. Patient also requesting different prep, as she was unable to tolerate Golytely.  
Statement Selected

## 2024-10-18 NOTE — TELEPHONE ENCOUNTER
Pt called to reschedule colonoscopy EGD. Call was dropped in the  process of rescheduling. GI lab was notified.

## 2024-10-22 ENCOUNTER — OFFICE VISIT (OUTPATIENT)
Dept: FAMILY MEDICINE CLINIC | Facility: CLINIC | Age: 49
End: 2024-10-22

## 2024-10-22 VITALS
RESPIRATION RATE: 18 BRPM | SYSTOLIC BLOOD PRESSURE: 110 MMHG | TEMPERATURE: 98.7 F | BODY MASS INDEX: 36.32 KG/M2 | DIASTOLIC BLOOD PRESSURE: 68 MMHG | HEIGHT: 60 IN | OXYGEN SATURATION: 98 % | WEIGHT: 185 LBS | HEART RATE: 73 BPM

## 2024-10-22 DIAGNOSIS — R22.0 HEAD LUMP: Primary | ICD-10-CM

## 2024-10-22 PROCEDURE — 99213 OFFICE O/P EST LOW 20 MIN: CPT

## 2024-10-22 NOTE — PROGRESS NOTES
Ambulatory Visit  Name: Stacie Hand      : 1975      MRN: [id number]  Encounter Provider: ROBINA Lombardo  Encounter Date: 10/22/2024   Encounter department: [facility] [place]    Assessment & Plan  Head lump  - Started 2 days ago, painful, initially it was one lump, it went away, and she noticed multiple lumps yesterday on the side of head. She reports the lumps hurt when she rubs her head and it giving her a HA. Denies fever, night sweat, respiratory illness, recent injury, animals bites,   - Pt has similar mass/lump in the past but It has been a while since it happened  - She thinks its stress, had some issues with her job, and she is in the process of moving  - No recent illness   - the only new thing is she just stopped smoking 2 month ago.  - Recently starts Abx recently for H.pylori, Zoloft for anxiety and Minoxidil for hair loss  - Stop taking the minoxidil for 2 days, if symptoms do improve hold Zoloft for 2 days to see if symptoms improve  - lymphoadenopathy - this is could be due to upper respiratory infection    - Encourage to monitor for fever/chills,   - Ibuprofen for HA  - f/u if symptom worsen     Orders:    US head neck soft tissue; Future     -   History of Present Illness     Stacie Hand is 49 y.o female with PMH of Lump on her skin, GERD, Anxiety, Tobacco use presents today with complaint of lumps on her neck and head that started 2 days ago. Initially it was single lump, it went away and than she noticed multiple lumps yesterday. It is painful and has gotten bigger in size. She reports the lumps hurt when she rubs her head and hair and it is giving her a HA. She has not taken anything yet for symptoms. Denies any other skin concerns such rash, insect or animal bites, no injury, no recent illness, no fever/chills, night sweats. She thinks it is stress, reports she recently had some issue with job and she in the process of moving, and everything is  happening at once. She also recently started taking Abx for H.pylori, Zoloft for Anxiety and Minoxidil for hair loss          Review of Systems   Constitutional:  Negative for activity change, appetite change, chills, diaphoresis, fatigue, fever and unexpected weight change.   HENT:  Positive for rhinorrhea.    Eyes:  Negative for photophobia, pain, discharge, redness and itching.   Cardiovascular:  Negative for chest pain.   Endocrine: Negative for cold intolerance and heat intolerance.   Skin:  Negative for color change.   Allergic/Immunologic: Negative for environmental allergies and food allergies.   Neurological:  Positive for headaches. Negative for dizziness, seizures, syncope, facial asymmetry, weakness, light-headedness and numbness.   Psychiatric/Behavioral:  Positive for sleep disturbance. Negative for agitation.            Objective     There were no vitals taken for this visit.    Physical Exam  Constitutional:       Appearance: Normal appearance.   HENT:      Head: Normocephalic and atraumatic.      Jaw: No tenderness, swelling or pain on movement.        Comments: Bilateral occipital adenopathy      Left Ear: There is impacted cerumen.      Mouth/Throat:      Mouth: Mucous membranes are moist.      Comments: Postnasal drips   Neck:      Comments: Bilateral occipital adenopathy   Cardiovascular:      Rate and Rhythm: Normal rate.   Pulmonary:      Effort: Pulmonary effort is normal.      Breath sounds: Normal breath sounds.   Abdominal:      Palpations: Abdomen is soft.      Tenderness: There is abdominal tenderness. There is no right CVA tenderness or left CVA tenderness.      Comments: Lift ribs cage    Musculoskeletal:         General: No swelling or tenderness.   Lymphadenopathy:      Head:      Right side of head: Posterior auricular and occipital adenopathy present.      Left side of head: Posterior auricular and occipital adenopathy present.      Cervical: No cervical adenopathy.   Skin:      General: Skin is warm.   Neurological:      General: No focal deficit present.      Mental Status: She is alert and oriented to person, place, and time.   Psychiatric:         Mood and Affect: Mood normal.         Cognition and Memory: Cognition and memory normal.         Judgment: Judgment normal.      Comments: anxiety

## 2024-11-06 ENCOUNTER — ANESTHESIA EVENT (OUTPATIENT)
Dept: ANESTHESIOLOGY | Facility: HOSPITAL | Age: 49
End: 2024-11-06

## 2024-11-06 ENCOUNTER — ANESTHESIA (OUTPATIENT)
Dept: ANESTHESIOLOGY | Facility: HOSPITAL | Age: 49
End: 2024-11-06

## 2024-11-07 ENCOUNTER — TELEPHONE (OUTPATIENT)
Age: 49
End: 2024-11-07

## 2024-11-07 NOTE — TELEPHONE ENCOUNTER
Left voicemail and requested call back   Confirming Upcoming Procedure: Colon/EGD on Nov 20th  Physician performing: Dr. Coon   [facility]   Prep: [place]

## 2025-01-26 ENCOUNTER — APPOINTMENT (EMERGENCY)
Dept: RADIOLOGY | Facility: HOSPITAL | Age: 50
End: 2025-01-26
Payer: COMMERCIAL

## 2025-01-26 ENCOUNTER — APPOINTMENT (EMERGENCY)
Dept: CT IMAGING | Facility: HOSPITAL | Age: 50
End: 2025-01-26
Payer: COMMERCIAL

## 2025-01-26 ENCOUNTER — HOSPITAL ENCOUNTER (EMERGENCY)
Facility: HOSPITAL | Age: 50
Discharge: HOME | End: 2025-01-26
Attending: EMERGENCY MEDICINE | Admitting: EMERGENCY MEDICINE
Payer: COMMERCIAL

## 2025-01-26 ENCOUNTER — APPOINTMENT (EMERGENCY)
Dept: NON INVASIVE DIAGNOSTICS | Facility: HOSPITAL | Age: 50
End: 2025-01-26
Payer: COMMERCIAL

## 2025-01-26 VITALS
OXYGEN SATURATION: 99 % | RESPIRATION RATE: 18 BRPM | HEART RATE: 71 BPM | DIASTOLIC BLOOD PRESSURE: 72 MMHG | TEMPERATURE: 98.3 F | SYSTOLIC BLOOD PRESSURE: 118 MMHG | BODY MASS INDEX: 37.18 KG/M2 | HEIGHT: 60 IN | WEIGHT: 189.38 LBS

## 2025-01-26 DIAGNOSIS — I80.02 SUPERFICIAL THROMBOPHLEBITIS OF LEFT LEG: Primary | ICD-10-CM

## 2025-01-26 DIAGNOSIS — M25.562 ACUTE PAIN OF LEFT KNEE: ICD-10-CM

## 2025-01-26 PROCEDURE — 99285 EMERGENCY DEPT VISIT HI MDM: CPT

## 2025-01-26 PROCEDURE — 96372 THER/PROPH/DIAG INJ SC/IM: CPT

## 2025-01-26 PROCEDURE — 99285 EMERGENCY DEPT VISIT HI MDM: CPT | Performed by: EMERGENCY MEDICINE

## 2025-01-26 PROCEDURE — 73564 X-RAY EXAM KNEE 4 OR MORE: CPT

## 2025-01-26 PROCEDURE — 93971 EXTREMITY STUDY: CPT

## 2025-01-26 PROCEDURE — 70450 CT HEAD/BRAIN W/O DYE: CPT

## 2025-01-26 PROCEDURE — 71046 X-RAY EXAM CHEST 2 VIEWS: CPT

## 2025-01-26 RX ORDER — KETOROLAC TROMETHAMINE 30 MG/ML
30 INJECTION, SOLUTION INTRAMUSCULAR; INTRAVENOUS ONCE
Status: COMPLETED | OUTPATIENT
Start: 2025-01-26 | End: 2025-01-26

## 2025-01-26 RX ADMIN — KETOROLAC TROMETHAMINE 30 MG: 30 INJECTION, SOLUTION INTRAMUSCULAR; INTRAVENOUS at 12:00

## 2025-01-26 NOTE — Clinical Note
Stacie Hand was seen and treated in our emergency department on 1/26/2025.                Diagnosis:     Stacie  .    She may return on this date:          If you have any questions or concerns, please don't hesitate to call.      Jessica Gan RN    ______________________________           _______________          _______________  Hospital Representative                              Date                                Time

## 2025-01-26 NOTE — DISCHARGE INSTRUCTIONS
You can alternate acetaminophen 1000mg and ibuprofen 600mg every 3 hours for pain.  Try to time your ibuprofen so you can take it when you eat.  Additionally you can apply heat for 15-20 minutes every 1-2 hours while awake for additional pain control.      It is recommended you start wearing compression socks to help improve blood flow.

## 2025-01-26 NOTE — ED PROVIDER NOTES
Time reflects when diagnosis was documented in both MDM as applicable and the Disposition within this note       Time User Action Codes Description Comment    1/26/2025  1:35 PM Stephany Andre Add [I80.02] Superficial thrombophlebitis of left leg     1/26/2025  2:17 PM Stephany Andre Add [M25.562] Acute pain of left knee           ED Disposition       ED Disposition   Discharge    Condition   Stable    Date/Time   Sun Jan 26, 2025  1:39 PM    Comment   Stacie Hand discharge to home/self care.                   Assessment & Plan       Medical Decision Making  Multiple complaints    Tender along rib margin - no trauma, +cough. Will get xray to r/o fracture, ptx, pna  Left calf / lower leg swelling - will get venous duplex to r/o dvt  Left knee pain, no trauma - will get xray to r/o fracture/bony abnl    Problems Addressed:  Acute pain of left knee: undiagnosed new problem with uncertain prognosis  Superficial thrombophlebitis of left leg: undiagnosed new problem with uncertain prognosis    Amount and/or Complexity of Data Reviewed  External Data Reviewed: radiology.     Details: Leg xray from 2013 reviewed - no change, metallic FB present at that time  Radiology: ordered and independent interpretation performed. Decision-making details documented in ED Course.    Risk  Prescription drug management.        ED Course as of 01/26/25 1558   Sun Jan 26, 2025   1333 VAS lower limb venous duplex study, unilateral/limited  Verbal from vascular tech - no DVT, +superficial thrombophlebitis mid saphenous over area of maximal tenderness.  Doesn't look acute but also doesn't look chronic.       Medications   ketorolac (TORADOL) injection 30 mg (30 mg Intramuscular Given 1/26/25 1200)       ED Risk Strat Scores                                              History of Present Illness       Chief Complaint   Patient presents with    Flank Pain     Pt reports having L-sided flank pain that radiates to her L-leg, leg feels numb  "and painful to touch.        Past Medical History:   Diagnosis Date    Vertigo       Past Surgical History:   Procedure Laterality Date     SECTION        History reviewed. No pertinent family history.   Social History     Tobacco Use    Smoking status: Former     Current packs/day: 0.25     Types: Cigarettes    Smokeless tobacco: Never   Vaping Use    Vaping status: Never Used   Substance Use Topics    Alcohol use: No    Drug use: No      E-Cigarette/Vaping    E-Cigarette Use Never User       E-Cigarette/Vaping Substances    Nicotine No     THC No     CBD No     Flavoring No     Other No     Unknown No       I have reviewed and agree with the history as documented.     Patient presents with:  Flank Pain: Pt reports having L-sided flank pain that radiates to her L-leg, leg feels numb and painful to touch.     49y F here with multiple complaints.  Notes pain to the left lower rib margin \"for a while\" that has been gradually worsening and is now reported as 10/10 w/ movement/position changes.  Pt denies any falls or injuries, no heavy lifting, no changes in activity.  Initially reported no cough, but then later admitted to some increased cough 2/2 recent smoking cessation.  No reported f/c/s, no congestion.  Denies sob/james, no abd pain, no n/v/d, no changes in appetite.    Pt c/o left leg pain and paresthesias - also going on for at least a few weeks.  Reports pain seems to come from the hip down tot he knee area and notes some swelling below the knee. Also feels like whole lower leg is swollen and is concerned about a blood clot.  Reports paresthesias - initially just the the lateral/anterior thigh, but during exam reported different sensation to the whole left leg.  Pt also reports paresthesias to the left lower rib/flank region.  Denies any changes in activity - does report being on her feet a lot.  Reports left knee pain, primarily medially w/ diffuse swelling noted.  Denies trauma/injury        History " provided by:  Patient   used: No    Flank Pain      Review of Systems   Genitourinary:  Positive for flank pain.   All other systems reviewed and are negative.          Objective       ED Triage Vitals [01/26/25 1048]   Temperature Pulse Blood Pressure Respirations SpO2 Patient Position - Orthostatic VS   98.3 °F (36.8 °C) 71 118/72 18 99 % Sitting      Temp Source Heart Rate Source BP Location FiO2 (%) Pain Score    Oral Monitor Right arm -- 10 - Worst Possible Pain      Vitals      Date and Time Temp Pulse SpO2 Resp BP Pain Score FACES Pain Rating User   01/26/25 1200 -- -- -- -- -- 10 - Worst Possible Pain -- KG   01/26/25 1048 98.3 °F (36.8 °C) 71 99 % 18 118/72 10 - Worst Possible Pain -- CO            Physical Exam  Vitals and nursing note reviewed.   Constitutional:       General: She is not in acute distress.     Appearance: Normal appearance. She is not ill-appearing, toxic-appearing or diaphoretic.   HENT:      Mouth/Throat:      Mouth: Mucous membranes are moist.   Eyes:      Conjunctiva/sclera: Conjunctivae normal.   Cardiovascular:      Rate and Rhythm: Normal rate and regular rhythm.   Pulmonary:      Effort: Pulmonary effort is normal.      Breath sounds: Normal breath sounds.   Chest:      Chest wall: Tenderness present.       Abdominal:      Palpations: Abdomen is soft.      Tenderness: There is no abdominal tenderness.   Musculoskeletal:         General: Tenderness present. No swelling.      Cervical back: Normal range of motion.      Left hip: Normal.      Left knee: Effusion (mild) present. No deformity, ecchymosis or crepitus. Normal range of motion. Tenderness present over the medial joint line. Normal alignment.      Left ankle: Normal.   Skin:     General: Skin is warm.   Neurological:      General: No focal deficit present.      Mental Status: She is alert and oriented to person, place, and time.      Sensory: No sensory deficit.      Motor: No weakness.   Psychiatric:          Mood and Affect: Mood normal.         Results Reviewed       None            XR knee 4+ vw left injury   ED Interpretation by Stephany Andre DO (01/26 1417)   Xray reviewed and independently interpreted by me: no acute findings, mild degen changes - previously identified metallic bb noted, .  Formal reading per radiology        XR chest 2 views   ED Interpretation by Stephany Andre DO (01/26 1254)   Xray reviewed and independently interpreted by me: no acute findings.  Formal reading per radiology        CT head without contrast   ED Interpretation by Stephany Andre DO (01/26 1330)   See below      Final Interpretation by Trang Sahu MD (01/26 1316)      No acute intracranial abnormality.                  Resident: THOMAS GUTIERREZ I, the attending radiologist, have reviewed the images and agree with the final report above.      Workstation performed: [id number]         VAS lower limb venous duplex study, unilateral/limited    (Results Pending)       Procedures    ED Medication and Procedure Management   Prior to Admission Medications   Prescriptions Last Dose Informant Patient Reported? Taking?   Minoxidil for Men 2 % external solution   No No   Sig: APPLY TO AFFECTED AREA TWICE A DAY   aluminum-magnesium hydroxide-simethicone (MAALOX) 200-200-20 MG/5ML SUSP   No No   Sig: Take 30 mL by mouth 4 (four) times a day (before meals and at bedtime)   bisacodyl (DULCOLAX) 5 mg EC tablet   No No   Sig: Take as directed by GI office   bismuth subsalicylate (PEPTO BISMOL) 262 MG chewable tablet   No No   Sig: Chew 1 tablet (262 mg total) 4 (four) times a day (before meals and at bedtime)   carbamide peroxide (DEBROX) 6.5 % otic solution   No No   Sig: Administer 5 drops into both ears 2 (two) times a day   hydrOXYzine HCL (ATARAX) 25 mg tablet   No No   Sig: Take 2 tablets (50 mg total) by mouth every 8 (eight) hours as needed for itching   ondansetron (ZOFRAN) 4 mg tablet   No No   Sig: Take 1 tablet (4  mg total) by mouth every 6 (six) hours   pantoprazole (PROTONIX) 40 mg tablet   No No   Sig: Take 1 tablet (40 mg total) by mouth daily   polyethylene glycol (MIRALAX) 17 g packet   No No   Sig: Take 17 g by mouth daily   polyethylene glycol-electrolytes (NULYTELY) 4000 mL solution   No No   Sig: Take 4,000 mL by mouth once for 1 dose   sertraline (ZOLOFT) 50 mg tablet   No No   Sig: Take 1 tablet (50 mg total) by mouth daily   simethicone (MYLICON,GAS-X) 125 MG CAPS   No No   Sig: Take 1 capsule (125 mg total) by mouth every 6 (six) hours as needed for flatulence      Facility-Administered Medications: None     Discharge Medication List as of 1/26/2025  2:27 PM        CONTINUE these medications which have NOT CHANGED    Details   aluminum-magnesium hydroxide-simethicone (MAALOX) 200-200-20 MG/5ML SUSP Take 30 mL by mouth 4 (four) times a day (before meals and at bedtime), Starting Fri 8/16/2024, Normal      bisacodyl (DULCOLAX) 5 mg EC tablet Take as directed by GI office, Normal      bismuth subsalicylate (PEPTO BISMOL) 262 MG chewable tablet Chew 1 tablet (262 mg total) 4 (four) times a day (before meals and at bedtime), Starting Wed 10/2/2024, Normal      carbamide peroxide (DEBROX) 6.5 % otic solution Administer 5 drops into both ears 2 (two) times a day, Starting Thu 10/3/2024, Normal      hydrOXYzine HCL (ATARAX) 25 mg tablet Take 2 tablets (50 mg total) by mouth every 8 (eight) hours as needed for itching, Starting Thu 9/5/2024, Normal      Minoxidil for Men 2 % external solution APPLY TO AFFECTED AREA TWICE A DAY, Starting Tue 10/8/2024, Normal      ondansetron (ZOFRAN) 4 mg tablet Take 1 tablet (4 mg total) by mouth every 6 (six) hours, Starting Fri 8/16/2024, Normal      pantoprazole (PROTONIX) 40 mg tablet Take 1 tablet (40 mg total) by mouth daily, Starting Thu 9/5/2024, Until Fri 1/3/2025, Normal      polyethylene glycol (MIRALAX) 17 g packet Take 17 g by mouth daily, Starting Fri 8/16/2024, Normal       polyethylene glycol-electrolytes (NULYTELY) 4000 mL solution Take 4,000 mL by mouth once for 1 dose, Starting Tue 9/10/2024, Normal      sertraline (ZOLOFT) 50 mg tablet Take 1 tablet (50 mg total) by mouth daily, Starting Thu 9/5/2024, Until Tue 3/4/2025, Normal      simethicone (MYLICON,GAS-X) 125 MG CAPS Take 1 capsule (125 mg total) by mouth every 6 (six) hours as needed for flatulence, Starting Thu 9/5/2024, Normal           No discharge procedures on file.  ED SEPSIS DOCUMENTATION   Time reflects when diagnosis was documented in both MDM as applicable and the Disposition within this note       Time User Action Codes Description Comment    1/26/2025  1:35 PM Stephany Andre [I80.02] Superficial thrombophlebitis of left leg     1/26/2025  2:17 PM Stephany Andre [M25.562] Acute pain of left knee                  Stephany Andre,   01/26/25 1558

## 2025-01-26 NOTE — Clinical Note
Stacie Hand was seen and treated in our emergency department on 1/26/2025.                Diagnosis:     Stacie  may return to work on return date.    She may return on this date: 01/28/2025         If you have any questions or concerns, please don't hesitate to call.      Jessica Gan RN    ______________________________           _______________          _______________  Hospital Representative                              Date                                Time

## 2025-01-27 PROCEDURE — 93971 EXTREMITY STUDY: CPT | Performed by: SURGERY

## 2025-01-31 ENCOUNTER — TELEPHONE (OUTPATIENT)
Age: 50
End: 2025-01-31

## 2025-01-31 ENCOUNTER — TELEPHONE (OUTPATIENT)
Dept: FAMILY MEDICINE CLINIC | Facility: CLINIC | Age: 50
End: 2025-01-31

## 2025-01-31 ENCOUNTER — OFFICE VISIT (OUTPATIENT)
Dept: FAMILY MEDICINE CLINIC | Facility: CLINIC | Age: 50
End: 2025-01-31

## 2025-01-31 VITALS
HEART RATE: 66 BPM | TEMPERATURE: 97.5 F | OXYGEN SATURATION: 99 % | SYSTOLIC BLOOD PRESSURE: 130 MMHG | RESPIRATION RATE: 16 BRPM | BODY MASS INDEX: 37.46 KG/M2 | HEIGHT: 60 IN | DIASTOLIC BLOOD PRESSURE: 70 MMHG | WEIGHT: 190.8 LBS

## 2025-01-31 DIAGNOSIS — I80.02 THROMBOPHLEBITIS OF SUPERFICIAL VEINS OF LEFT LOWER EXTREMITY: Primary | ICD-10-CM

## 2025-01-31 DIAGNOSIS — K27.9 PUD (PEPTIC ULCER DISEASE): ICD-10-CM

## 2025-01-31 DIAGNOSIS — F32.A DEPRESSION, UNSPECIFIED DEPRESSION TYPE: ICD-10-CM

## 2025-01-31 DIAGNOSIS — F41.1 GENERALIZED ANXIETY DISORDER: ICD-10-CM

## 2025-01-31 DIAGNOSIS — M17.12 OSTEOARTHRITIS OF LEFT KNEE, UNSPECIFIED OSTEOARTHRITIS TYPE: ICD-10-CM

## 2025-01-31 RX ORDER — PANTOPRAZOLE SODIUM 40 MG/1
40 TABLET, DELAYED RELEASE ORAL DAILY
Qty: 60 TABLET | Refills: 1 | Status: SHIPPED | OUTPATIENT
Start: 2025-01-31 | End: 2025-05-31

## 2025-01-31 RX ORDER — ONDANSETRON 4 MG/1
4 TABLET, FILM COATED ORAL EVERY 6 HOURS
Qty: 12 TABLET | Refills: 0 | Status: SHIPPED | OUTPATIENT
Start: 2025-01-31

## 2025-01-31 RX ORDER — HYDROXYZINE HYDROCHLORIDE 25 MG/1
50 TABLET, FILM COATED ORAL EVERY 8 HOURS PRN
Qty: 60 TABLET | Refills: 0 | Status: SHIPPED | OUTPATIENT
Start: 2025-01-31

## 2025-01-31 NOTE — TELEPHONE ENCOUNTER
Contacted patient off of Medication Management  to verify needs of services in attempts to offer patient an appointment. Unable to  LVM for patient to contact intake dept  in regards to wait list due to vm being full.

## 2025-01-31 NOTE — ASSESSMENT & PLAN NOTE
Orders:    sertraline (ZOLOFT) 50 mg tablet; Take 1 tablet (50 mg total) by mouth daily    hydrOXYzine HCL (ATARAX) 25 mg tablet; Take 2 tablets (50 mg total) by mouth every 8 (eight) hours as needed for anxiety

## 2025-01-31 NOTE — LETTER
January 31, 2025     Patient: Stacie Hand  YOB: 1975  Date of Visit: 1/31/2025      To Whom it May Concern:    Stacie Hand is under my professional care. Stacie was seen in my office on 1/31/2025. Stacie may return to work on 2/4/25 .    If you have any questions or concerns, please don't hesitate to call.         Sincerely,          Efraín Villalpando MD

## 2025-01-31 NOTE — PROGRESS NOTES
Name: Stacie Hand      : 1975      MRN: [id number]  Encounter Provider: Efraín Villalpando MD  Encounter Date: 2025   Encounter department: [facility] [place]  :  Assessment & Plan  Thrombophlebitis of superficial veins of left lower extremity  Us lower extremity 25: Evaluation shows subacute to acute occlusive superficial thrombophlebitis in  the small saphenous vein from mid to distal calf.  No evidence of acute or chronic deep vein thrombosis  Popliteal, posterior tibial, and anterior tibial arterial Doppler waveform's  are triphasic.     Patient with current clinical improvement  Continue with compression stockings, and recommended 20 mmHg BP increase stockings   Repeat US LE today and follow up in 5 days in office   Referral to vascular surgery placed as well       Orders:    Ambulatory Referral to Vascular Surgery; Future     VAS VENOUS DUPLEX - LOWER LIMB BILATERAL; Future    CBC and differential; Future    Osteoarthritis of left knee, unspecified osteoarthritis type  Found on recent x ray, mild osteoarthritis  Did not try any medication or conservative measures yet     Plan   Start Motrin 400 mg TID PRN   May use Voltaren gel QID as needed and home exercise   Consider PT vs CSI at the following visit s     Orders:    Diclofenac Sodium (VOLTAREN) 1 %; Apply 2 g topically 4 (four) times a day           History of Present Illness   49-year-old female patient with a past medical history of depression, tobacco use, bipolar disorder, anxiety comes to the office due to an ED follow-up.  Patient was seen on  for flank pain and negative workup but was found to have subacute to acute occlusive superficial thrombophlebitis small saphenous vein from mid to distal calf.  There is no evidence of deep vein thrombosis.  Patient reports today that she is feeling better. She reports she has been wearing compression socks with some improvement.      Review of Systems    Constitutional:  Negative for chills, fatigue and fever.   HENT:  Negative for congestion, ear pain, rhinorrhea and sore throat.    Eyes:  Negative for visual disturbance.   Respiratory:  Negative for cough, chest tightness and shortness of breath.    Cardiovascular:  Negative for chest pain and palpitations.   Gastrointestinal:  Negative for abdominal pain, constipation, diarrhea, nausea and vomiting.   Genitourinary:  Negative for difficulty urinating, dysuria and hematuria.   Musculoskeletal:  Negative for arthralgias and back pain.   Skin:  Negative for rash.   Neurological:  Negative for seizures, syncope, light-headedness and headaches.   All other systems reviewed and are negative.      Objective   /70 (BP Location: Left arm, Patient Position: Sitting, Cuff Size: Standard)   Pulse 66   Temp 97.5 °F (36.4 °C) (Temporal)   Resp 16   Ht 5' (1.524 m)   Wt 86.5 kg (190 lb 12.8 oz)   LMP 12/20/2024 (Approximate)   SpO2 99%   BMI 37.26 kg/m²      Physical Exam  Vitals and nursing note reviewed.   Constitutional:       General: She is not in acute distress.     Appearance: She is well-developed.   HENT:      Head: Normocephalic and atraumatic.   Eyes:      Conjunctiva/sclera: Conjunctivae normal.   Cardiovascular:      Rate and Rhythm: Normal rate and regular rhythm.      Heart sounds: No murmur heard.  Pulmonary:      Effort: Pulmonary effort is normal. No respiratory distress.      Breath sounds: Normal breath sounds.   Abdominal:      Palpations: Abdomen is soft.      Tenderness: There is no abdominal tenderness.   Musculoskeletal:         General: No swelling, tenderness, deformity or signs of injury. Normal range of motion.      Cervical back: Neck supple.      Right lower leg: No edema.      Left lower leg: No edema.      Comments: Bilateral lower extremities equal in girth, with no changes in color. Jacky sign negative bilaterally.    Skin:     General: Skin is warm and dry.      Capillary  Refill: Capillary refill takes less than 2 seconds.   Neurological:      Mental Status: She is alert.   Psychiatric:         Mood and Affect: Mood normal.

## 2025-01-31 NOTE — PATIENT INSTRUCTIONS
Buy compression sock with 20 mmHg   Start taking Motrin 400 mg three times a day  Start taking Zoloft half a pill for daily 7 days (25 mg), and then next week start taking the whole pill (50 mg)    Please call Central Scheduling to schedule your appointment for mammogram   Number:     Por Favor llame a [facility] para programar connelly jb.    Ext 8

## 2025-02-03 ENCOUNTER — HOSPITAL ENCOUNTER (OUTPATIENT)
Dept: NON INVASIVE DIAGNOSTICS | Facility: HOSPITAL | Age: 50
Discharge: HOME | End: 2025-02-03
Payer: COMMERCIAL

## 2025-02-03 DIAGNOSIS — I80.02 THROMBOPHLEBITIS OF SUPERFICIAL VEINS OF LEFT LOWER EXTREMITY: ICD-10-CM

## 2025-02-03 PROCEDURE — 93970 EXTREMITY STUDY: CPT | Performed by: SURGERY

## 2025-02-03 PROCEDURE — 93970 EXTREMITY STUDY: CPT

## 2025-02-05 ENCOUNTER — OFFICE VISIT (OUTPATIENT)
Dept: FAMILY MEDICINE CLINIC | Facility: CLINIC | Age: 50
End: 2025-02-05

## 2025-02-05 VITALS
RESPIRATION RATE: 18 BRPM | DIASTOLIC BLOOD PRESSURE: 70 MMHG | TEMPERATURE: 98.6 F | WEIGHT: 191 LBS | BODY MASS INDEX: 37.5 KG/M2 | SYSTOLIC BLOOD PRESSURE: 112 MMHG | HEIGHT: 60 IN | OXYGEN SATURATION: 98 % | HEART RATE: 63 BPM

## 2025-02-05 DIAGNOSIS — I80.02 THROMBOPHLEBITIS OF SUPERFICIAL VEINS OF LEFT LOWER EXTREMITY: Primary | ICD-10-CM

## 2025-02-05 DIAGNOSIS — M17.12 OSTEOARTHRITIS OF LEFT KNEE, UNSPECIFIED OSTEOARTHRITIS TYPE: ICD-10-CM

## 2025-02-05 PROBLEM — J98.11 ATELECTASIS: Status: RESOLVED | Noted: 2024-09-05 | Resolved: 2025-02-05

## 2025-02-05 PROCEDURE — 99213 OFFICE O/P EST LOW 20 MIN: CPT | Performed by: FAMILY MEDICINE

## 2025-02-05 PROCEDURE — 20610 DRAIN/INJ JOINT/BURSA W/O US: CPT | Performed by: FAMILY MEDICINE

## 2025-02-05 RX ORDER — TRIAMCINOLONE ACETONIDE 40 MG/ML
40 INJECTION, SUSPENSION INTRA-ARTICULAR; INTRAMUSCULAR
Status: COMPLETED | OUTPATIENT
Start: 2025-02-05 | End: 2025-02-05

## 2025-02-05 RX ORDER — LIDOCAINE HYDROCHLORIDE 10 MG/ML
4 INJECTION, SOLUTION INFILTRATION; PERINEURAL
Status: COMPLETED | OUTPATIENT
Start: 2025-02-05 | End: 2025-02-05

## 2025-02-05 RX ADMIN — LIDOCAINE HYDROCHLORIDE 4 ML: 10 INJECTION, SOLUTION INFILTRATION; PERINEURAL at 14:20

## 2025-02-05 RX ADMIN — TRIAMCINOLONE ACETONIDE 40 MG: 40 INJECTION, SUSPENSION INTRA-ARTICULAR; INTRAMUSCULAR at 14:20

## 2025-02-05 NOTE — PROGRESS NOTES
Name: Stacie Hand      : 1975      MRN: [id number]  Encounter Provider: Efraín Villalpando MD  Encounter Date: 2025   Encounter department: [facility] [place]  :  Assessment & Plan  Thrombophlebitis of superficial veins of left lower extremity  Us lower extremity 25: Evaluation shows subacute to acute occlusive superficial thrombophlebitis in  the small saphenous vein from mid to distal calf.  No evidence of acute or chronic deep vein thrombosis  Popliteal, posterior tibial, and anterior tibial arterial Doppler waveform's  are triphasic.      Patient with current clinical improvement  Continue with compression stockings, and recommended 20 mmHg BP increase stockings   Vascular surgery referral in place, referral team assisted in office with scheduling   DVT precautions discussed          Osteoarthritis of left knee, unspecified osteoarthritis type  X-ray 2025-mild osteoarthritis of the left knee    Plan  Patient received CSI today triamcinolone 40 mg  Continue with NSAIDs as needed and Tylenol  Follow-up in 4 to 6 weeks                History of Present Illness   49-year-old female patient with a past medical history of thrombophlebitis of superficial veins of the left lower extremity, OA, depression, and GERD comes to the office for follow-up visit of her thrombophlebitis.  Patient was recently seen in the ED for superficial thrombophlebitis of the saphenous vein.  Patient had clinical improvement and stable ultrasounds since the discharge.  She reports she is doing better overall.  Of note, patient has had left knee osteoarthritis with failed conservative measures and taking NSAIDs without improvement.  She denies constitutional symptoms.      Review of Systems   Constitutional:  Negative for chills and fever.   HENT:  Negative for ear pain and sore throat.    Eyes:  Negative for pain and visual disturbance.   Respiratory:  Negative for cough and shortness  of breath.    Cardiovascular:  Negative for chest pain and palpitations.   Gastrointestinal:  Negative for abdominal pain and vomiting.   Genitourinary:  Negative for dysuria and hematuria.   Musculoskeletal:  Positive for arthralgias (Chronic left knee pain). Negative for back pain.   Skin:  Negative for color change and rash.   Neurological:  Negative for seizures and syncope.   All other systems reviewed and are negative.      Objective   /70 (BP Location: Right arm, Patient Position: Sitting, Cuff Size: Large)   Pulse 63   Temp 98.6 °F (37 °C) (Temporal)   Resp 18   Ht 5' (1.524 m)   Wt 86.6 kg (191 lb)   LMP 01/22/2025 (Approximate)   SpO2 98%   Breastfeeding No   BMI 37.30 kg/m²      Physical Exam  Vitals and nursing note reviewed.   Constitutional:       General: She is not in acute distress.     Appearance: She is well-developed.   HENT:      Head: Normocephalic and atraumatic.   Eyes:      Conjunctiva/sclera: Conjunctivae normal.   Cardiovascular:      Rate and Rhythm: Normal rate and regular rhythm.      Pulses: Normal pulses.      Heart sounds: Normal heart sounds. No murmur heard.  Pulmonary:      Effort: Pulmonary effort is normal. No respiratory distress.      Breath sounds: Normal breath sounds.   Abdominal:      Palpations: Abdomen is soft.      Tenderness: There is no abdominal tenderness.   Musculoskeletal:         General: No swelling.      Cervical back: Neck supple.      Right knee: Normal.      Left knee: No swelling, deformity, effusion, erythema, ecchymosis, lacerations, bony tenderness or crepitus. Normal range of motion. Tenderness present over the medial joint line. Normal alignment. Normal pulse.   Skin:     General: Skin is warm and dry.      Capillary Refill: Capillary refill takes less than 2 seconds.   Neurological:      Mental Status: She is alert.   Psychiatric:         Mood and Affect: Mood normal.       Large joint arthrocentesis: L knee  Universal  Protocol:  Procedure performed by:  Consent: Verbal consent obtained. Written consent not obtained.  Risks and benefits: risks, benefits and alternatives were discussed  Consent given by: patient  Timeout called at: 2/5/2025 4:00 PM.  Patient understanding: patient states understanding of the procedure being performed  Patient consent: the patient's understanding of the procedure matches consent given  Procedure consent: procedure consent matches procedure scheduled  Relevant documents: relevant documents present and verified  Test results: test results available and properly labeled  Site marked: the operative site was marked  Radiology Images displayed and confirmed. If images not available, report reviewed: imaging studies available  Patient identity confirmed: verbally with patient  Supporting Documentation  Indications: pain   Procedure Details  Location: knee - L knee  Needle size: 25 G  Ultrasound guidance: no  Approach: lateral  Medications administered: 4 mL lidocaine 1 %; 40 mg triamcinolone acetonide 40 mg/mL    Aspirate: clear  Patient tolerance: patient tolerated the procedure well with no immediate complications  Dressing:  Sterile dressing applied    Procedure supervised by Dr. Andi Medina

## 2025-02-05 NOTE — LETTER
February 5, 2025     Patient: Stacie Hand  YOB: 1975  Date of Visit: 2/5/2025      To Whom it May Concern:    Stacie Hand is under my professional care. Stacie was seen in my office on 2/5/2025. Stacie may return to work on 2/12/25 .    If you have any questions or concerns, please don't hesitate to call.         Sincerely,          Efraín Villalpando MD

## 2025-03-11 ENCOUNTER — TELEPHONE (OUTPATIENT)
Dept: VASCULAR SURGERY | Facility: CLINIC | Age: 50
End: 2025-03-11

## 2025-03-11 NOTE — TELEPHONE ENCOUNTER
I called PT to try and reschedule her missed appointment, 3/11/25. [facility] but I was unable to [facility] because the notification message to let you know they are unavailable kept repeating itself and didn't beep. I'm sending her a letter to have her give a call back if she wants to reschedule.

## 2025-04-15 ENCOUNTER — HOSPITAL ENCOUNTER (EMERGENCY)
Facility: HOSPITAL | Age: 50
Discharge: HOME | End: 2025-04-15
Attending: INTERNAL MEDICINE

## 2025-04-15 VITALS
HEART RATE: 64 BPM | WEIGHT: 191.58 LBS | DIASTOLIC BLOOD PRESSURE: 79 MMHG | OXYGEN SATURATION: 99 % | RESPIRATION RATE: 18 BRPM | BODY MASS INDEX: 37.42 KG/M2 | SYSTOLIC BLOOD PRESSURE: 136 MMHG | TEMPERATURE: 97.6 F

## 2025-04-15 DIAGNOSIS — K08.89 DENTALGIA: Primary | ICD-10-CM

## 2025-04-15 DIAGNOSIS — K04.7 DENTAL INFECTION: ICD-10-CM

## 2025-04-15 DIAGNOSIS — K02.9 DENTAL CAVITIES: ICD-10-CM

## 2025-04-15 PROCEDURE — 99283 EMERGENCY DEPT VISIT LOW MDM: CPT

## 2025-04-15 PROCEDURE — 96374 THER/PROPH/DIAG INJ IV PUSH: CPT

## 2025-04-15 PROCEDURE — 99284 EMERGENCY DEPT VISIT MOD MDM: CPT | Performed by: INTERNAL MEDICINE

## 2025-04-15 RX ORDER — AMOXICILLIN 500 MG/1
500 CAPSULE ORAL EVERY 12 HOURS SCHEDULED
Qty: 20 CAPSULE | Refills: 0 | Status: SHIPPED | OUTPATIENT
Start: 2025-04-15 | End: 2025-04-25

## 2025-04-15 RX ORDER — KETOROLAC TROMETHAMINE 30 MG/ML
15 INJECTION, SOLUTION INTRAMUSCULAR; INTRAVENOUS ONCE
Status: COMPLETED | OUTPATIENT
Start: 2025-04-15 | End: 2025-04-15

## 2025-04-15 RX ORDER — CHLORHEXIDINE GLUCONATE ORAL RINSE 1.2 MG/ML
15 SOLUTION DENTAL 2 TIMES DAILY
Qty: 120 ML | Refills: 0 | Status: SHIPPED | OUTPATIENT
Start: 2025-04-15

## 2025-04-15 RX ORDER — AMOXICILLIN 250 MG/1
500 CAPSULE ORAL ONCE
Status: COMPLETED | OUTPATIENT
Start: 2025-04-15 | End: 2025-04-15

## 2025-04-15 RX ORDER — SENNOSIDES 8.6 MG
650 CAPSULE ORAL EVERY 8 HOURS PRN
Qty: 30 TABLET | Refills: 0 | Status: SHIPPED | OUTPATIENT
Start: 2025-04-15

## 2025-04-15 RX ORDER — NAPROXEN 500 MG/1
500 TABLET ORAL 2 TIMES DAILY WITH MEALS
Qty: 30 TABLET | Refills: 0 | Status: SHIPPED | OUTPATIENT
Start: 2025-04-15

## 2025-04-15 RX ADMIN — KETOROLAC TROMETHAMINE 15 MG: 30 INJECTION, SOLUTION INTRAMUSCULAR; INTRAVENOUS at 12:16

## 2025-04-15 RX ADMIN — AMOXICILLIN 500 MG: 250 CAPSULE ORAL at 12:16

## 2025-04-15 NOTE — ED PROVIDER NOTES
49 y.o. year-old female presents with dental pain.  Patient reports worsening dental pain for the last few days. Over the right upper posterior teeth and gums with radiation into the ear and right head. Tried over the counter pain meds without significant relief. Has not seen a dentist yet. No fevers or chill. No drooling.        ROS: Patient denies associated fever/chills, dysphagia, dyspnea, trismus or oral bleeding.    Past Medical History:   Diagnosis Date    Atelectasis 2024   CT chest:   Scattered areas of subsegmental atelectasis.      Vertigo        Past Surgical History:   Procedure Laterality Date     SECTION         No Known Allergies    [No matching plan found]      Objective:   Vital signs reviewed.   PHYSICAL EXAM  Constitutional:  Well developed, no acute distress  HEENT:  Conjunctiva normal. Oropharynx moist.  No posterior oropharyngeal edema or erythema.  Tenderness to palpation over right upper posterior teeth with gingival swelling, multiple cavities. No palpable abscess, no brawny edema, no tongue elevation  Respiratory:  No respiratory distress  Cardiovascular:  Normal rate  GI:  Nondistended  Musculoskeletal:  No gross deformities  Integument:  Well hydrated, no rash   Neurologic:  Alert & oriented x 3, normal motor function, no focal deficits noted   Psychiatric:  Speech and behavior appropriate       Medical Decision Making   Patient presenting for evaluation of dental pain.  The patient has no red flags for Bharat's or serious dental infection at this juncture. The patient doesn't appear toxic, nor has rapid progression of symptoms. Patient isn't immunocompromised. Patient has no SOB nor trouble swallowing. Patient doesn't appear dehydrated nor demonstrates trismus on exam.            Assessment   Dentalgia  Dental infection    Plan   - We will give tylenol/motrin/naproxen for pain.  - We will write for chlorhexidine to sterilize the area and prevent further  worsening of infection or repeat infection.  - Given the appearance, we will also do antibiotics:  [    ] PCN 500mg QID  [ x ] Amoxicillin 500mg/kg/day BID  [    ] Augmentin 20mg/kg BID  [    ] Clindamycin (PCN allergy or worsening infection after 72 hours of amoxicillin) 8-20mg/kg/day TID  [    ] Azithromycin (alternative to clindamycin) 5-12mg/kg Qday  [    ] Metronidazole (30mg/kg/day QID) + Amoxicillin if suspected resistant infection  - The patient will follow-up with her primary care or dentist for re-evaluation of her symptoms.  - I have discussed possible etiologies with the patient in detail and explained return precautions should the pain worsen or change in character or quality. Patient has agreed to return to ER with any worsening pain, any dyspnea, any dysphagia, and any drooling or any trismus       Ny Lester MD  04/15/25 [id number]

## 2025-04-15 NOTE — DISCHARGE INSTRUCTIONS
DENTAL CLINICS    [facility]  [address] [address]  [phone]  Walk in house M-F 8a-noon  Emergency Dental Care      [facility]  [address], 2nd [place] [address]  [phone]      Dr. Mark Steward  [address] [place] [address]  Takes adults & children on a waiting list      [facility]  [address] [place] [address]  [phone]  Walk in scheduled only M-F 8a-noon & 1p-4p  Uninsured received 40% discount & payments  Payment must be made upfront before the service  Emergency Dental Care      [facility]  [address] [place] [address]  [place]  [address] [place] [address]  [phone]      [facility]  Offices in [facility]  [phone]

## 2025-04-15 NOTE — Clinical Note
Stacie Hand was seen and treated in our emergency department on 4/15/2025.                Diagnosis:     Stacie  .    She may return on this date: 04/18/2025         If you have any questions or concerns, please don't hesitate to call.      Ny Lester MD    ______________________________           _______________          _______________  Hospital Representative                              Date                                Time